# Patient Record
Sex: MALE | Race: WHITE | NOT HISPANIC OR LATINO | URBAN - METROPOLITAN AREA
[De-identification: names, ages, dates, MRNs, and addresses within clinical notes are randomized per-mention and may not be internally consistent; named-entity substitution may affect disease eponyms.]

---

## 2020-11-13 ENCOUNTER — INPATIENT (INPATIENT)
Facility: HOSPITAL | Age: 85
LOS: 5 days | Discharge: HOME | DRG: 552 | End: 2020-11-19
Attending: SURGERY | Admitting: SURGERY
Payer: MEDICARE

## 2020-11-13 VITALS
SYSTOLIC BLOOD PRESSURE: 109 MMHG | OXYGEN SATURATION: 99 % | WEIGHT: 184.97 LBS | HEIGHT: 70 IN | HEART RATE: 69 BPM | RESPIRATION RATE: 18 BRPM | DIASTOLIC BLOOD PRESSURE: 68 MMHG

## 2020-11-13 DIAGNOSIS — S12.9XXA FRACTURE OF NECK, UNSPECIFIED, INITIAL ENCOUNTER: ICD-10-CM

## 2020-11-13 LAB
ALBUMIN SERPL ELPH-MCNC: 3.8 G/DL — SIGNIFICANT CHANGE UP (ref 3.3–5)
ALP SERPL-CCNC: 70 U/L — SIGNIFICANT CHANGE UP (ref 40–120)
ALT FLD-CCNC: 24 U/L — SIGNIFICANT CHANGE UP (ref 10–45)
ANION GAP SERPL CALC-SCNC: 16 MMOL/L — SIGNIFICANT CHANGE UP (ref 5–17)
ANISOCYTOSIS BLD QL: SLIGHT — SIGNIFICANT CHANGE UP
APPEARANCE UR: CLEAR — SIGNIFICANT CHANGE UP
APTT BLD: 31.9 SEC — SIGNIFICANT CHANGE UP (ref 27.5–35.5)
AST SERPL-CCNC: 28 U/L — SIGNIFICANT CHANGE UP (ref 10–40)
BACTERIA # UR AUTO: NEGATIVE — SIGNIFICANT CHANGE UP
BASOPHILS # BLD AUTO: 0 K/UL — SIGNIFICANT CHANGE UP (ref 0–0.2)
BASOPHILS NFR BLD AUTO: 0 % — SIGNIFICANT CHANGE UP (ref 0–2)
BILIRUB SERPL-MCNC: 0.7 MG/DL — SIGNIFICANT CHANGE UP (ref 0.2–1.2)
BILIRUB UR-MCNC: NEGATIVE — SIGNIFICANT CHANGE UP
BUN SERPL-MCNC: 78 MG/DL — HIGH (ref 7–23)
CALCIUM SERPL-MCNC: 9.4 MG/DL — SIGNIFICANT CHANGE UP (ref 8.4–10.5)
CHLORIDE SERPL-SCNC: 86 MMOL/L — LOW (ref 96–108)
CO2 SERPL-SCNC: 30 MMOL/L — SIGNIFICANT CHANGE UP (ref 22–31)
COLOR SPEC: SIGNIFICANT CHANGE UP
CREAT SERPL-MCNC: 2.24 MG/DL — HIGH (ref 0.5–1.3)
DIFF PNL FLD: NEGATIVE — SIGNIFICANT CHANGE UP
ELLIPTOCYTES BLD QL SMEAR: SLIGHT — SIGNIFICANT CHANGE UP
EOSINOPHIL # BLD AUTO: 0 K/UL — SIGNIFICANT CHANGE UP (ref 0–0.5)
EOSINOPHIL NFR BLD AUTO: 0 % — SIGNIFICANT CHANGE UP (ref 0–6)
EPI CELLS # UR: 0 /HPF — SIGNIFICANT CHANGE UP
ETHANOL SERPL-MCNC: SIGNIFICANT CHANGE UP MG/DL (ref 0–10)
GIANT PLATELETS BLD QL SMEAR: PRESENT — SIGNIFICANT CHANGE UP
GLUCOSE SERPL-MCNC: 189 MG/DL — HIGH (ref 70–99)
GLUCOSE UR QL: NEGATIVE — SIGNIFICANT CHANGE UP
HCT VFR BLD CALC: 34.5 % — LOW (ref 39–50)
HGB BLD-MCNC: 11.3 G/DL — LOW (ref 13–17)
HYALINE CASTS # UR AUTO: 1 /LPF — SIGNIFICANT CHANGE UP (ref 0–2)
INR BLD: 1.41 RATIO — HIGH (ref 0.88–1.16)
KETONES UR-MCNC: NEGATIVE — SIGNIFICANT CHANGE UP
LACTATE SERPL-SCNC: 2 MMOL/L — SIGNIFICANT CHANGE UP (ref 0.7–2)
LEUKOCYTE ESTERASE UR-ACNC: NEGATIVE — SIGNIFICANT CHANGE UP
LIDOCAIN IGE QN: 63 U/L — HIGH (ref 7–60)
LYMPHOCYTES # BLD AUTO: 0.91 K/UL — LOW (ref 1–3.3)
LYMPHOCYTES # BLD AUTO: 24.6 % — SIGNIFICANT CHANGE UP (ref 13–44)
MACROCYTES BLD QL: SLIGHT — SIGNIFICANT CHANGE UP
MANUAL SMEAR VERIFICATION: SIGNIFICANT CHANGE UP
MCHC RBC-ENTMCNC: 30.6 PG — SIGNIFICANT CHANGE UP (ref 27–34)
MCHC RBC-ENTMCNC: 32.8 GM/DL — SIGNIFICANT CHANGE UP (ref 32–36)
MCV RBC AUTO: 93.5 FL — SIGNIFICANT CHANGE UP (ref 80–100)
METAMYELOCYTES # FLD: 0.9 % — HIGH (ref 0–0)
MONOCYTES # BLD AUTO: 0.85 K/UL — SIGNIFICANT CHANGE UP (ref 0–0.9)
MONOCYTES NFR BLD AUTO: 22.8 % — HIGH (ref 2–14)
NEUTROPHILS # BLD AUTO: 1.92 K/UL — SIGNIFICANT CHANGE UP (ref 1.8–7.4)
NEUTROPHILS NFR BLD AUTO: 51.7 % — SIGNIFICANT CHANGE UP (ref 43–77)
NITRITE UR-MCNC: NEGATIVE — SIGNIFICANT CHANGE UP
NRBC # BLD: 1 /100 — HIGH (ref 0–0)
PH UR: 6 — SIGNIFICANT CHANGE UP (ref 5–8)
PLAT MORPH BLD: SIGNIFICANT CHANGE UP
PLATELET # BLD AUTO: 136 K/UL — LOW (ref 150–400)
POIKILOCYTOSIS BLD QL AUTO: SLIGHT — SIGNIFICANT CHANGE UP
POTASSIUM SERPL-MCNC: 2.6 MMOL/L — CRITICAL LOW (ref 3.5–5.3)
POTASSIUM SERPL-SCNC: 2.6 MMOL/L — CRITICAL LOW (ref 3.5–5.3)
PROT SERPL-MCNC: 7.1 G/DL — SIGNIFICANT CHANGE UP (ref 6–8.3)
PROT UR-MCNC: NEGATIVE — SIGNIFICANT CHANGE UP
PROTHROM AB SERPL-ACNC: 16.6 SEC — HIGH (ref 10.6–13.6)
RBC # BLD: 3.69 M/UL — LOW (ref 4.2–5.8)
RBC # FLD: 15 % — HIGH (ref 10.3–14.5)
RBC BLD AUTO: ABNORMAL
RBC CASTS # UR COMP ASSIST: 0 /HPF — SIGNIFICANT CHANGE UP (ref 0–4)
SARS-COV-2 RNA SPEC QL NAA+PROBE: SIGNIFICANT CHANGE UP
SODIUM SERPL-SCNC: 132 MMOL/L — LOW (ref 135–145)
SP GR SPEC: 1.01 — SIGNIFICANT CHANGE UP (ref 1.01–1.02)
TARGETS BLD QL SMEAR: SLIGHT — SIGNIFICANT CHANGE UP
TROPONIN T, HIGH SENSITIVITY RESULT: 25 NG/L — SIGNIFICANT CHANGE UP (ref 0–51)
UROBILINOGEN FLD QL: NEGATIVE — SIGNIFICANT CHANGE UP
WBC # BLD: 3.71 K/UL — LOW (ref 3.8–10.5)
WBC # FLD AUTO: 3.71 K/UL — LOW (ref 3.8–10.5)
WBC UR QL: 0 /HPF — SIGNIFICANT CHANGE UP (ref 0–5)

## 2020-11-13 PROCEDURE — 93010 ELECTROCARDIOGRAM REPORT: CPT | Mod: 59,GC

## 2020-11-13 PROCEDURE — 72125 CT NECK SPINE W/O DYE: CPT | Mod: 26

## 2020-11-13 PROCEDURE — 99291 CRITICAL CARE FIRST HOUR: CPT | Mod: CS,25,GC

## 2020-11-13 PROCEDURE — 74176 CT ABD & PELVIS W/O CONTRAST: CPT | Mod: 26

## 2020-11-13 PROCEDURE — 71250 CT THORAX DX C-: CPT | Mod: 26

## 2020-11-13 PROCEDURE — 70450 CT HEAD/BRAIN W/O DYE: CPT | Mod: 26

## 2020-11-13 PROCEDURE — 12011 RPR F/E/E/N/L/M 2.5 CM/<: CPT | Mod: GC

## 2020-11-13 PROCEDURE — 99222 1ST HOSP IP/OBS MODERATE 55: CPT

## 2020-11-13 PROCEDURE — 71045 X-RAY EXAM CHEST 1 VIEW: CPT | Mod: 26

## 2020-11-13 PROCEDURE — 72170 X-RAY EXAM OF PELVIS: CPT | Mod: 26

## 2020-11-13 PROCEDURE — 70486 CT MAXILLOFACIAL W/O DYE: CPT | Mod: 26

## 2020-11-13 PROCEDURE — 76377 3D RENDER W/INTRP POSTPROCES: CPT | Mod: 26

## 2020-11-13 PROCEDURE — 73080 X-RAY EXAM OF ELBOW: CPT | Mod: 26,RT

## 2020-11-13 RX ORDER — AMIODARONE HYDROCHLORIDE 400 MG/1
1 TABLET ORAL
Qty: 0 | Refills: 0 | DISCHARGE

## 2020-11-13 RX ORDER — GABAPENTIN 400 MG/1
1 CAPSULE ORAL
Qty: 0 | Refills: 0 | DISCHARGE

## 2020-11-13 RX ORDER — FUROSEMIDE 40 MG
20 TABLET ORAL DAILY
Refills: 0 | Status: DISCONTINUED | OUTPATIENT
Start: 2020-11-13 | End: 2020-11-19

## 2020-11-13 RX ORDER — FUROSEMIDE 40 MG
1 TABLET ORAL
Qty: 0 | Refills: 0 | DISCHARGE

## 2020-11-13 RX ORDER — POTASSIUM CHLORIDE 20 MEQ
10 PACKET (EA) ORAL
Refills: 0 | Status: COMPLETED | OUTPATIENT
Start: 2020-11-13 | End: 2020-11-13

## 2020-11-13 RX ORDER — TAMSULOSIN HYDROCHLORIDE 0.4 MG/1
0.4 CAPSULE ORAL AT BEDTIME
Refills: 0 | Status: DISCONTINUED | OUTPATIENT
Start: 2020-11-13 | End: 2020-11-19

## 2020-11-13 RX ORDER — COLCHICINE 0.6 MG
0.6 TABLET ORAL DAILY
Refills: 0 | Status: DISCONTINUED | OUTPATIENT
Start: 2020-11-13 | End: 2020-11-19

## 2020-11-13 RX ORDER — SODIUM CHLORIDE 9 MG/ML
1000 INJECTION, SOLUTION INTRAVENOUS
Refills: 0 | Status: DISCONTINUED | OUTPATIENT
Start: 2020-11-13 | End: 2020-11-15

## 2020-11-13 RX ORDER — COLCHICINE 0.6 MG
1 TABLET ORAL
Qty: 0 | Refills: 0 | DISCHARGE

## 2020-11-13 RX ORDER — TETANUS TOXOID, REDUCED DIPHTHERIA TOXOID AND ACELLULAR PERTUSSIS VACCINE, ADSORBED 5; 2.5; 8; 8; 2.5 [IU]/.5ML; [IU]/.5ML; UG/.5ML; UG/.5ML; UG/.5ML
0.5 SUSPENSION INTRAMUSCULAR ONCE
Refills: 0 | Status: COMPLETED | OUTPATIENT
Start: 2020-11-13 | End: 2020-11-13

## 2020-11-13 RX ORDER — AMIODARONE HYDROCHLORIDE 400 MG/1
200 TABLET ORAL DAILY
Refills: 0 | Status: DISCONTINUED | OUTPATIENT
Start: 2020-11-13 | End: 2020-11-19

## 2020-11-13 RX ORDER — ACETAMINOPHEN 500 MG
650 TABLET ORAL ONCE
Refills: 0 | Status: COMPLETED | OUTPATIENT
Start: 2020-11-13 | End: 2020-11-13

## 2020-11-13 RX ORDER — TAMSULOSIN HYDROCHLORIDE 0.4 MG/1
1 CAPSULE ORAL
Qty: 0 | Refills: 0 | DISCHARGE

## 2020-11-13 RX ADMIN — Medication 100 MILLIEQUIVALENT(S): at 15:56

## 2020-11-13 RX ADMIN — Medication 650 MILLIGRAM(S): at 11:42

## 2020-11-13 RX ADMIN — TETANUS TOXOID, REDUCED DIPHTHERIA TOXOID AND ACELLULAR PERTUSSIS VACCINE, ADSORBED 0.5 MILLILITER(S): 5; 2.5; 8; 8; 2.5 SUSPENSION INTRAMUSCULAR at 11:43

## 2020-11-13 RX ADMIN — Medication 100 MILLIEQUIVALENT(S): at 14:20

## 2020-11-13 RX ADMIN — SODIUM CHLORIDE 100 MILLILITER(S): 9 INJECTION, SOLUTION INTRAVENOUS at 19:15

## 2020-11-13 RX ADMIN — Medication 650 MILLIGRAM(S): at 16:55

## 2020-11-13 RX ADMIN — Medication 100 MILLIEQUIVALENT(S): at 16:59

## 2020-11-13 NOTE — CONSULT NOTE ADULT - SUBJECTIVE AND OBJECTIVE BOX
[facility]   MD Iron Jackson MD, D.O.  KATELYN Puentes    From 7 AM - 5 PM:  OFFICE: [phone]  Dr. Ramsay cell: [phone]  Dr. Pichardo Cell: [phone]  Dr. Prieto cell: [phone]  KATELYN Reese cell: [phone]    From 5 PM - 7 AM: Answering Service: [phone]  Date of service 11-13-20 @ 16:28    -- INITIAL RENAL CONSULT NOTE  --------------------------------------------------------------------------------  HPI:  89 y/o m with pmhx afib on Xarelto, HAYLEE, BIBEMS s/p unwitnessed fall. Pt states that he slipped and fall on his wet hard wood floor. Pt states that he fell face forward. Pt unclear if he had LOC. Spoke to pt's wife on the phone reports that he had been feeling dizzy for the past 1 day. Pt seen and examined in the ER. States he follow with Dr. Goldberg for many years and have never had renal disease. Labs reviewed on [facility], last scr 1.9 in 10/2019    Pt urinating and has no complaints.     PAST HISTORY  --------------------------------------------------------------------------------  PAST MEDICAL & SURGICAL HISTORY:  History of orchiectomy    Obstructive sleep apnea    Atrial fibrillation    Pericardial effusion    H/O total knee replacement      FAMILY HISTORY:    PAST SOCIAL HISTORY:    ALLERGIES & MEDICATIONS  --------------------------------------------------------------------------------  Allergies    shellfish (Unknown)  sulfa drugs (Unknown)    Intolerances      Standing Inpatient Medications  potassium chloride  10 mEq/100 mL IVPB 10 milliEquivalent(s) IV Intermittent every 1 hour    PRN Inpatient Medications      REVIEW OF SYSTEMS  --------------------------------------------------------------------------------  Gen: No fevers/chills  Skin: No rashes  Head/Eyes/Ears: Normal hearing,  Normal vision   Respiratory: No dyspnea, cough  CV: No chest pain  GI: No abdominal pain, diarrhea, constipation, nausea, vomiting  : No dysuria, hematuria  MSK: No  edema  Heme: No easy bruising or bleeding  Psych: No significant depression    All other systems were reviewed and are negative, except as noted.    VITALS/PHYSICAL EXAM  --------------------------------------------------------------------------------  T(C): 36.2 (11-13-20 @ 15:40), Max: 36.8 (11-13-20 @ 12:40)  HR: 68 (11-13-20 @ 15:40) (65 - 70)  BP: 133/74 (11-13-20 @ 15:40) (109/68 - 133/74)  RR: 20 (11-13-20 @ 15:40) (18 - 20)  SpO2: 100% (11-13-20 @ 15:40) (99% - 100%)  Wt(kg): --  Height (cm): 177.8 (11-13-20 @ 10:54)  Weight (kg): 83.9 (11-13-20 @ 10:54)  BMI (kg/m2): 26.5 (11-13-20 @ 10:54)  BSA (m2): 2.02 (11-13-20 @ 10:54)      Physical Exam:  	Gen: NAD, facial stitches   	HEENT: MMM, neck collar   	Pulm: CTA B/L  	CV: S1S2  	Abd: Soft, +BS   	Ext: No LE edema B/L  	Neuro: Awake  	Skin: Warm and dry, blood hands   	    LABS/STUDIES  --------------------------------------------------------------------------------              11.3   3.71  >-----------<  136      [11-13-20 @ 11:45]              34.5     132  |  86  |  78  ----------------------------<  189      [11-13-20 @ 11:45]  2.6   |  30  |  2.24        Ca     9.4     [11-13-20 @ 11:45]    TPro  7.1  /  Alb  3.8  /  TBili  0.7  /  DBili  x   /  AST  28  /  ALT  24  /  AlkPhos  70  [11-13-20 @ 11:45]    PT/INR: PT 16.6 , INR 1.41       [11-13-20 @ 11:45]  PTT: 31.9       [11-13-20 @ 11:45]      Creatinine Trend:  SCr 2.24 [11-13 @ 11:45]

## 2020-11-13 NOTE — H&P ADULT - NSHPLABSRESULTS_GEN_ALL_CORE
LABS:                        11.3   3.71  )-----------( 136      ( 13 Nov 2020 11:45 )             34.5     13 Nov 2020 11:45    132    |  86     |  78     ----------------------------<  189    2.6     |  30     |  2.24     Ca    9.4        13 Nov 2020 11:45    TPro  7.1    /  Alb  3.8    /  TBili  0.7    /  DBili  x      /  AST  28     /  ALT  24     /  AlkPhos  70     13 Nov 2020 11:45    PT/INR - ( 13 Nov 2020 11:45 )   PT: 16.6 sec;   INR: 1.41 ratio         PTT - ( 13 Nov 2020 11:45 )  PTT:31.9 sec  CAPILLARY BLOOD GLUCOSE      POCT Blood Glucose.: 188 mg/dL (13 Nov 2020 11:03)        LIVER FUNCTIONS - ( 13 Nov 2020 11:45 )  Alb: 3.8 g/dL / Pro: 7.1 g/dL / ALK PHOS: 70 U/L / ALT: 24 U/L / AST: 28 U/L / GGT: x               IMAGING:    CT Abdomen and Pelvis No Cont (11.13.20 @ 14:28)    FINDINGS:  CHEST:  LUNGS AND LARGE AIRWAYS: Patent central airways. Rounded atelectasis in the left lower lobe and lingula.  PLEURA: Small left pleural effusion with loculation and thickening likely on the basis of a chronic effusion.  VESSELS: Atherosclerotic changes of the aorta and coronary arteries.  HEART: Heart size is normal. No pericardial effusion.  MEDIASTINUM AND LYN: No lymphadenopathy.  CHEST WALL AND LOWER NECK: Left chest cardiac device.    ABDOMEN AND PELVIS:  LIVER: Within normal limits.  BILE DUCTS: Normal caliber.  GALLBLADDER: Within normal limits.  SPLEEN: Within normal limits.  PANCREAS: Within normal limits.  ADRENALS: Within normal limits.  KIDNEYS/URETERS: Within normal limits.    BLADDER: Within normal limits.  REPRODUCTIVE ORGANS: Prostate is enlarged.    BOWEL: No bowel obstruction. Colonic diverticulosis.  PERITONEUM: No ascites.  VESSELS: Atherosclerotic changes.  RETROPERITONEUM/LYMPH NODES: No lymphadenopathy. No retroperitoneal hemorrhage.  ABDOMINAL WALL: Within normal limits.  BONES: Degenerative changes. Left shoulder replacement.    IMPRESSION:  No acute pathology. Specifically, no retroperitoneal hemorrhage.  Chronic left pleural effusion with rounded atelectasis in the lingula and left lower lobe.

## 2020-11-13 NOTE — ED PROVIDER NOTE - CLINICAL SUMMARY MEDICAL DECISION MAKING FREE TEXT BOX
ATTG: : fall with head trauma - check ct head /max facial / neck / chest abd and pelvis,check  labs, tetanus, xray elbow, check coags, re eval for dispo

## 2020-11-13 NOTE — ED PROVIDER NOTE - CARE PLAN
Principal Discharge DX:	Cervical spine fracture  Secondary Diagnosis:	Hypokalemia  Secondary Diagnosis:	Nasal bone fracture  Secondary Diagnosis:	Fall at home, initial encounter

## 2020-11-13 NOTE — ED PROVIDER NOTE - NS ED ROS FT
GENERAL: No fever or chills, EYES: no change in vision, HEENT: +head injury no trouble speaking, CARDIAC: no chest pain, palpitation PULMONARY: no cough or SOB, GI: no abdominal pain, no nausea, no vomiting, no diarrhea or constipation, : No changes in urination, SKIN: + lacerations no rashes, NEURO: + headache,  MSK: No muscle pain ~Melodie Christine MD

## 2020-11-13 NOTE — H&P ADULT - NSICDXPASTMEDICALHX_GEN_ALL_CORE_FT
PAST MEDICAL HISTORY:  Atrial fibrillation     History of orchiectomy     Obstructive sleep apnea     Pericardial effusion

## 2020-11-13 NOTE — CONSULT NOTE ADULT - ASSESSMENT
SUNNY RUIZ  89M w/ PMHx of aFib on Xafaustinato, HAYLEE, presenting to [facility] after suffering a unwitnessed mechanical fall found to have multiple facial fxs and cervical fxs. Currently has no headache, neck pain or radiculopathy/ numbness/ weakness.   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 multi level degen and foraminal stenosis. HCT negative, T/L spine negative for fx. Has AS.   Exam: intact with facial lac/ bruising.   - no acute neurosurgical intervention  -C collar at all times  - Please obtain a cervical brace. Please contact aramis blackmon for a fitted brace: [phone]  -MRI c spine wo

## 2020-11-13 NOTE — ED ADULT NURSE NOTE - NSIMPLEMENTINTERV_GEN_ALL_ED
Implemented All Fall with Harm Risk Interventions:  [place] to call system. Call bell, personal items and telephone within reach. Instruct patient to call for assistance. Room bathroom lighting operational. Non-slip footwear when patient is off stretcher. Physically safe environment: no spills, clutter or unnecessary equipment. Stretcher in lowest position, wheels locked, appropriate side rails in place. Provide visual cue, wrist band, yellow gown, etc. Monitor gait and stability. Monitor for mental status changes and reorient to person, place, and time. Review medications for side effects contributing to fall risk. Reinforce activity limits and safety measures with patient and family. Provide visual clues: red socks.

## 2020-11-13 NOTE — H&P ADULT - ASSESSMENT
89M presenting s/p mechanical fall found to have nasal bone fracture cervical spine fractures, and right SCM hematoma.    PLAN:  - Admit to Acute Care Surgery under Dr. Lane  - Given mechanism of injury and findings of multiple injuries, patient would benefit from pain control and consultation to plastic Sx for facial fractures and Orthopeadic surgery for cervical fractures.  - F/u spine and plastic surgery recommendations    Seen and discussed with attending surgeon Dr. Lane.    RAISSA Cerda, PGY-2   [facility]   Acute Care Surgery   [id number]

## 2020-11-13 NOTE — ED ADULT NURSE REASSESSMENT NOTE - NS ED NURSE REASSESS COMMENT FT1
Pt back from CT, endorsing mild pain at this time. VSS, safety maintained, will continue to monitor.

## 2020-11-13 NOTE — ED PROVIDER NOTE - OBJECTIVE STATEMENT
Melodie Christine MD: 89 y/o m with pmhx afib on Xarelto, HAYLEE, BIBEMS s/p unwitnessed fall. Pt states that he slipped and fall on his wet hard wood floor. Pt states that he fell face forward. Pt unclear if he had LOC. Spoke to pt's wife on the phone reports that he had been feeling dizzy for the past 1 day.

## 2020-11-13 NOTE — ED PROVIDER NOTE - ATTENDING CONTRIBUTION TO CARE
88 y/o m with pmhx afib on Xafaustinato, HAYLEE, presents by EMS from  home for fall with laceration to face. patient believes he tripped and fell. Unknown LOC. no weakness or numbness. bleeding from face. tetanus - unknown status.   GCS: 15  Primary Survey ABCDE intact  Secondary Survey:  Gen:  No respiratory Distress  /no distress from pain  HEENT: pupils 3 mm reactive to light equally, laceration above right eyebrow approx 3 cm, laceration to nose bridge on right side approx 2.5 cm linear. no step off. no nasal sep hematoma. mouth - dried blood, no loose teeth. patent airway with uvula midline.  EOMI  NO Raccoon Eyes/ Li Sign/ Neck: C- spine non tender. no step off or deformity. tm clear.   Lungs: breath sounds:   CVS: S1S2,    Distal Pulses:  Abd: soft non tender no distention  Extremities: no edema or erythema. no tenderness.   MSK: strength: 5/5 b/l upper and lower ext, moving all ext spontaneously  Back: no midline tend or step off  Neuro: aaox3 no foal deficits.

## 2020-11-13 NOTE — CONSULT NOTE ADULT - ASSESSMENT
88 y/o m with pmhx afib on Xarelto, HAYLEE, BIBEMS s/p unwitnessed fall. Pt states that he slipped and fall on his wet hard wood floor.     NURYS on CKD vs. progressive CKD   Labs reviewed on [facility], last scr 1.6 in 10/2019    Now admitted with elevated Scr   Pt non-oliguric   Pt receiving IVF in the ER   CT scan without hydronephrosis   Monitor urine output for hematuria   Check UA with urine electrolytes   Avoid nephrotoxins     Hypokalemia   serum K low   s/p IV replacement in the ER  recommend repeat BMP prior to d/c    HTN  BP controlled    Anemia  Hb stable

## 2020-11-13 NOTE — ED PROVIDER NOTE - PHYSICAL EXAMINATION
Gen: AAOx3, non-toxic  Head: NCAT  HEENT: EOMI, PERRLA, oral mucosa moist, normal conjunctiva, no septal hematoma seen in both nostrils. ttp of the nasal bone b/l  Lung: CTAB, no respiratory distress, no wheezes/rhonchi/rales B/L, speaking in full sentences  CV: RRR, no murmurs, rubs or gallops  Abd: soft, NTND, no guarding, no CVA tenderness, no rebound tenderness  MSK: no visible deformities, full range of motion of all 4 exts  Neuro: No focal sensory or motor deficits  Skin: Warm, well perfused, no rash,  2 cm laceration avoe the right eyebrow, +2 cm curved laceration on the right nasal bridge  Psych: normal affect.   ~Melodie Christine MD

## 2020-11-13 NOTE — H&P ADULT - HISTORY OF PRESENT ILLNESS
GENERAL SURGERY TRAUMA SERVICE   --------------------------------------------------------------------------------------------    TRAUMA ACTIVATION LEVEL: 3    MECHANISM OF INJURY:      [] Blunt:     [] Motor vehicle collision       [X] Fall       [] Pedestrian struck	      [] Motorcycle accident      [] Penetrating:     [] Gun shot wound       [] Stab wound    CHIEF COMPLAINT: Patient is a 89y old  Male who presents with a chief complaint of mechanical fall.    HPI: 89M w/ PMHx of aFib on [facility], HAYLEE, presenting to [facility] after suffering a unwitnessed mechanical fall. Patient was walking at home when he felt dizzy and lightheaded and fell forward hitting his face. Unknown LOC. Patient taken to the ED for further evaluation. Wife expressed that patient had been feeling dizzy and lightheaded for the past day.    In the ED, patient was afebrile, hemodynamically stable, labs remarkable for significant electrolyte imbalances with a potassium of 2.6 and acute kidney injury with BUN of 78 and Creatinine of 2.24, CTH and neck revealed comminuted fractures of bilateral nasal bones and soft tissue swelling right sternomastoid muscle with low attenuation collection and fluid level, (6:102), prevertebral soft tissue swelling, fused C5-C7 vertebra, linear lucencies concerning for fractures of C5, C6, C7 vertebra, horizontal fracture through osseous fused anterior longitudinal ligament C5-C6, (605:28, 604:20), correlate for hyperextension injury.  No fevers/chills, nausea/vomiting, chest pain/shortness of breath.    PRIMARY SURVEY:   A - airway intact  B - bilateral breath sounds and good chest rise  C - initial BP  BP: 133/74 (11-13-20 @ 15:40), HR HR: 68 (11-13-20 @ 15:40), palpable pulses in all extremities  D - GCS 15 on arrival. E 4, V 5, M 6.   Exposure obtained    SECONDARY SURVEY:  General: NAD  HEENT: Normocephalic, EOMI, PEERLA, laceration in right periorbital region s/p suturing in the ED, nasal swelling  Neck: Soft, midline trachea, C-collar in place  Chest: No chest wall tenderness  Cardiac: S1, S2, RRR  Respiratory: Bilateral breath sounds clear and equal   Abdomen: Soft, non-distended, non-tender; no rebound or guarding; no palpable masses   Groin: Normal appearing  Extremities: Palpable radial & DP pulses bilaterally. Motor and sensory grossly intact in all 4 extremities.  Back: No TTP; no palpable runoff/stepoff/deformity    ROS: 10-system review all negative except as noted in HPI.

## 2020-11-13 NOTE — H&P ADULT - ATTENDING COMMENTS
arranged for well fit / non extrication collar  spine consultation noted  Plastic surgery evaluation for nasal bone fracture  will need admission to trauma

## 2020-11-13 NOTE — H&P ADULT - NSHPPHYSICALEXAM_GEN_ALL_CORE
VITAL SIGNS:  Vital Signs Last 24 Hrs  T(C): 36.2 (13 Nov 2020 15:40), Max: 36.8 (13 Nov 2020 12:40)  T(F): 97.1 (13 Nov 2020 15:40), Max: 98.2 (13 Nov 2020 12:40)  HR: 68 (13 Nov 2020 15:40) (65 - 70)  BP: 133/74 (13 Nov 2020 15:40) (109/68 - 133/74)  BP(mean): --  RR: 20 (13 Nov 2020 15:40) (18 - 20)  SpO2: 100% (13 Nov 2020 15:40) (99% - 100%)      PHYSICAL EXAM:    General: NAD  HEENT: Normocephalic, EOMI, PEERLA, laceration in right periorbital region s/p suturing in the ED, nasal swelling  Neck: Soft, midline trachea, C-collar in place  Chest: No chest wall tenderness  Cardiac: S1, S2, RRR  Respiratory: Bilateral breath sounds clear and equal   Abdomen: Soft, non-distended, non-tender; no rebound or guarding; no palpable masses   Groin: Normal appearing  Extremities: Palpable radial & DP pulses bilaterally. Motor and sensory grossly intact in all 4 extremities.  Back: No TTP; no palpable runoff/stepoff/deformity

## 2020-11-13 NOTE — ED ADULT NURSE REASSESSMENT NOTE - NS ED NURSE REASSESS COMMENT FT1
pts linin changed, C-spine immobilization maintained. pt asking where is watch and pajamas are. RN did not fins any watch or clothing for pt. pt has jewelry in orange top cup sealed in zip lock in his hand. pt awaiting bed assignment

## 2020-11-13 NOTE — ED PROVIDER NOTE - PMH
Atrial fibrillation    History of orchiectomy    Obstructive sleep apnea    Pericardial effusion

## 2020-11-13 NOTE — ED ADULT NURSE NOTE - OBJECTIVE STATEMENT
Pt is an 89yM PMH a-fib on xarelto, HAYLEE BIBEMS s/p mechanical fall. P/w laceration over R eye and to bridge of nose, unknown LOC. Pt endorses slipping in the bathroom and falling. Denies dizziness, lightheadedness, chest pain, HA, changes to vision, numbness, tingling. A&Ox4, BLACK, lungs clear, distal pulses intact, abdomen soft nontender, lacerations noted to R eyebrow and nasal bridge. Side rails up for safety, call bell and personal items within reach, instructed to call for assistance, verbalizes understanding. Will continue to monitor.

## 2020-11-13 NOTE — CONSULT NOTE ADULT - SUBJECTIVE AND OBJECTIVE BOX
HPI: 89M w/ PMHx of aFib on [place], HAYLEE, presenting to [facility] after suffering a unwitnessed mechanical fall. Patient was walking at home when he felt dizzy and lightheaded and fell forward hitting his face. Unknown LOC. Patient taken to the ED for further evaluation. Wife expressed that patient had been feeling dizzy and lightheaded for the past day.    In the ED, patient was afebrile, hemodynamically stable, labs remarkable for significant electrolyte imbalances with a potassium of 2.6 and acute kidney injury with BUN of 78 and Creatinine of 2.24, CTH and neck revealed comminuted fractures of bilateral nasal bones and soft tissue swelling right sternomastoid muscle with low attenuation collection and fluid level, (6:102), prevertebral soft tissue swelling, fused C5-C7 vertebra, linear lucencies concerning for fractures of C5, C6, C7 vertebra, horizontal fracture through osseous fused anterior longitudinal ligament C5-C6, (605:28, 604:20), correlate for hyperextension injury.  No fevers/chills, nausea/vomiting, chest pain/shortness of breath.    PRIMARY SURVEY:   A - airway intact  B - bilateral breath sounds and good chest rise  C - initial BP  BP: 133/74 (11-13-20 @ 15:40), HR HR: 68 (11-13-20 @ 15:40), palpable pulses in all extremities  D - GCS 15 on arrival. E 4, V 5, M 6.   Exposure obtained    SECONDARY SURVEY:  General: NAD  HEENT: Normocephalic, EOMI, PEERLA, laceration in right periorbital region s/p suturing in the ED, nasal swelling  Neck: Soft, midline trachea, C-collar in place  Chest: No chest wall tenderness  Cardiac: S1, S2, RRR  Respiratory: Bilateral breath sounds clear and equal   Abdomen: Soft, non-distended, non-tender; no rebound or guarding; no palpable masses   Groin: Normal appearing  Extremities: Palpable radial & DP pulses bilaterally. Motor and sensory grossly intact in all 4 extremities.  Back: No TTP; no palpable runoff/stepoff/deformity    ROS: 10-system review all negative except as noted in HPI.   (13 Nov 2020 16:44)    Exam:  AOx3, FC, PERRL, EOMI, no facial, 5/5 throughout, no drift    --Anticoagulation:    =====================  PAST MEDICAL HISTORY   History of orchiectomy    Obstructive sleep apnea    Atrial fibrillation    Pericardial effusion      PAST SURGICAL HISTORY   H/O total knee replacement      shellfish (Unknown)  sulfa drugs (Unknown)      MEDICATIONS:  Antibiotics:    Neuro:    Other:  aMIOdarone    Tablet 200 milliGRAM(s) Oral daily  colchicine 0.6 milliGRAM(s) Oral daily  furosemide    Tablet 20 milliGRAM(s) Oral daily  lactated ringers. 1000 milliLiter(s) IV Continuous <Continuous>  metolazone 5 milliGRAM(s) Oral daily  predniSONE   Tablet 10 milliGRAM(s) Oral daily  tamsulosin 0.4 milliGRAM(s) Oral at bedtime      SOCIAL HISTORY:   Occupation:   Marital Status:     FAMILY HISTORY:      ROS: Negative except per HPI    LABS:  PT/INR - ( 13 Nov 2020 11:45 )   PT: 16.6 sec;   INR: 1.41 ratio         PTT - ( 13 Nov 2020 11:45 )  PTT:31.9 sec                        11.3   3.71  )-----------( 136      ( 13 Nov 2020 11:45 )             34.5     11-13    132<L>  |  86<L>  |  78<H>  ----------------------------<  189<H>  2.6<LL>   |  30  |  2.24<H>    Ca    9.4      13 Nov 2020 11:45    TPro  7.1  /  Alb  3.8  /  TBili  0.7  /  DBili  x   /  AST  28  /  ALT  24  /  AlkPhos  70  11-13

## 2020-11-14 LAB
ALBUMIN SERPL ELPH-MCNC: 2.9 G/DL — LOW (ref 3.3–5)
ALP SERPL-CCNC: 53 U/L — SIGNIFICANT CHANGE UP (ref 40–120)
ALT FLD-CCNC: 19 U/L — SIGNIFICANT CHANGE UP (ref 10–45)
ANION GAP SERPL CALC-SCNC: 13 MMOL/L — SIGNIFICANT CHANGE UP (ref 5–17)
AST SERPL-CCNC: 26 U/L — SIGNIFICANT CHANGE UP (ref 10–40)
BILIRUB SERPL-MCNC: 0.8 MG/DL — SIGNIFICANT CHANGE UP (ref 0.2–1.2)
BUN SERPL-MCNC: 52 MG/DL — HIGH (ref 7–23)
CALCIUM SERPL-MCNC: 8.4 MG/DL — SIGNIFICANT CHANGE UP (ref 8.4–10.5)
CHLORIDE SERPL-SCNC: 94 MMOL/L — LOW (ref 96–108)
CO2 SERPL-SCNC: 28 MMOL/L — SIGNIFICANT CHANGE UP (ref 22–31)
CREAT SERPL-MCNC: 1.36 MG/DL — HIGH (ref 0.5–1.3)
GLUCOSE SERPL-MCNC: 92 MG/DL — SIGNIFICANT CHANGE UP (ref 70–99)
HCT VFR BLD CALC: 28.5 % — LOW (ref 39–50)
HGB BLD-MCNC: 9.4 G/DL — LOW (ref 13–17)
MAGNESIUM SERPL-MCNC: 1.6 MG/DL — SIGNIFICANT CHANGE UP (ref 1.6–2.6)
MCHC RBC-ENTMCNC: 31.1 PG — SIGNIFICANT CHANGE UP (ref 27–34)
MCHC RBC-ENTMCNC: 33 GM/DL — SIGNIFICANT CHANGE UP (ref 32–36)
MCV RBC AUTO: 94.4 FL — SIGNIFICANT CHANGE UP (ref 80–100)
NRBC # BLD: 0 /100 WBCS — SIGNIFICANT CHANGE UP (ref 0–0)
PHOSPHATE SERPL-MCNC: 2.3 MG/DL — LOW (ref 2.5–4.5)
PLATELET # BLD AUTO: 111 K/UL — LOW (ref 150–400)
POTASSIUM SERPL-MCNC: 3 MMOL/L — LOW (ref 3.5–5.3)
POTASSIUM SERPL-SCNC: 3 MMOL/L — LOW (ref 3.5–5.3)
PROT SERPL-MCNC: 5.6 G/DL — LOW (ref 6–8.3)
RBC # BLD: 3.02 M/UL — LOW (ref 4.2–5.8)
RBC # FLD: 15.4 % — HIGH (ref 10.3–14.5)
SODIUM SERPL-SCNC: 135 MMOL/L — SIGNIFICANT CHANGE UP (ref 135–145)
WBC # BLD: 3.56 K/UL — LOW (ref 3.8–10.5)
WBC # FLD AUTO: 3.56 K/UL — LOW (ref 3.8–10.5)

## 2020-11-14 PROCEDURE — 99232 SBSQ HOSP IP/OBS MODERATE 35: CPT

## 2020-11-14 RX ORDER — POTASSIUM PHOSPHATE, MONOBASIC POTASSIUM PHOSPHATE, DIBASIC 236; 224 MG/ML; MG/ML
30 INJECTION, SOLUTION INTRAVENOUS ONCE
Refills: 0 | Status: COMPLETED | OUTPATIENT
Start: 2020-11-14 | End: 2020-11-14

## 2020-11-14 RX ADMIN — SODIUM CHLORIDE 100 MILLILITER(S): 9 INJECTION, SOLUTION INTRAVENOUS at 06:38

## 2020-11-14 RX ADMIN — POTASSIUM PHOSPHATE, MONOBASIC POTASSIUM PHOSPHATE, DIBASIC 83.33 MILLIMOLE(S): 236; 224 INJECTION, SOLUTION INTRAVENOUS at 22:31

## 2020-11-14 RX ADMIN — TAMSULOSIN HYDROCHLORIDE 0.4 MILLIGRAM(S): 0.4 CAPSULE ORAL at 21:03

## 2020-11-14 RX ADMIN — SODIUM CHLORIDE 100 MILLILITER(S): 9 INJECTION, SOLUTION INTRAVENOUS at 21:04

## 2020-11-14 RX ADMIN — Medication 0.6 MILLIGRAM(S): at 11:03

## 2020-11-14 RX ADMIN — AMIODARONE HYDROCHLORIDE 200 MILLIGRAM(S): 400 TABLET ORAL at 06:38

## 2020-11-14 RX ADMIN — Medication 20 MILLIGRAM(S): at 06:38

## 2020-11-14 RX ADMIN — Medication 10 MILLIGRAM(S): at 06:38

## 2020-11-14 NOTE — PROGRESS NOTE ADULT - ASSESSMENT
89M presenting s/p mechanical fall found to have nasal bone fracture cervical spine fractures, and right SCM hematoma. Patient is with C collar, neck immobilized, facial lacerations sutured, and hemodynamically stable.    PLAN:    - Advance diet as tolerated  - Pain control   - Will obtain C-collar, Kel Siu aware and on board.   - Appreciate Neuro recs: C- collar all time and MRI C-spine.  - Appreciate Plastic's recs: No immediate intervention for nasal bone fracture. Patient can f/u as outpatient one week after discharge.  - Isaiah improving, downtrending, renal following  -  Holding DVT ppx    Acute Care Surgery   [id number]     89M presenting s/p mechanical fall found to have nasal bone fracture cervical spine fractures, and right SCM hematoma. Patient is with C collar, neck immobilized, facial lacerations sutured, and hemodynamically stable.    PLAN:    - Advance diet as tolerated  - Pain control   - Will obtain C-collar, Kel Siu aware and on board.   - Appreciate Neuro recs: C- collar all time.  - MRI C-spine pending magnet compatibility with patient's Pacemaker.  - Appreciate Plastic's recs: No immediate intervention for nasal bone fracture. Patient can f/u as outpatient one week after discharge.  - Isaiah improving, downtrending, renal following  -  Holding DVT ppx    Acute Care Surgery   [id number]     89M presenting s/p mechanical fall found to have nasal bone fracture cervical spine fractures, and right SCM hematoma. Patient is with C collar, neck immobilized, facial lacerations sutured, and hemodynamically stable.    PLAN:    - Advance diet as tolerated  - Pain control   - Will obtain C-collar, Kel Siu aware and on board.   - Appreciate Neuro recs: C- collar all time.  - MRI C-spine pending magnet compatibility with patient's Pacemaker.  - Appreciate Plastic's recs: No immediate intervention for nasal bone fracture. Patient can f/u as outpatient one week after discharge.  - Isaiah improving, downtrending, renal following  -  Will hold AC (xarelto) Will resume DVT ppx (40 mg Lovenox) starting from Tmr.        Acute Care Surgery   [id number]

## 2020-11-14 NOTE — PHYSICAL THERAPY INITIAL EVALUATION ADULT - CRITERIA FOR SKILLED THERAPEUTIC INTERVENTIONS
impairments found/anticipated discharge recommendation/home PT for progressive bed mobs, transfers, amb training, strengthening and standing balance. / rolling walker

## 2020-11-14 NOTE — PROGRESS NOTE ADULT - SUBJECTIVE AND OBJECTIVE BOX
Patient evaluated measured and fitted for rigid  cervical spinal orthosis to treat post C5,C6,C7 fractures  delivered today in the ER holding delivered by [facility] [phone]   ordered by neurosurgery.

## 2020-11-14 NOTE — PHYSICAL THERAPY INITIAL EVALUATION ADULT - GENERAL OBSERVATIONS, REHAB EVAL
Rec'd in bed, + aspen collar, ecchymosis face w/ stitches above eyebrows, + IV LUE, NAD< agreeable to PT/ dtr present 1/2 way through session

## 2020-11-14 NOTE — PHYSICAL THERAPY INITIAL EVALUATION ADULT - PLANNED THERAPY INTERVENTIONS, PT EVAL
To negotiate one flight of stairs w/one handrail step over step 4 weeks/transfer training/bed mobility training/gait training/strengthening/balance training

## 2020-11-14 NOTE — PROGRESS NOTE ADULT - SUBJECTIVE AND OBJECTIVE BOX
No acute events reported over the past 24hrs    Patient seen and examined at bedside. Feeling well.   Pain is well controlled.  Tolerating diet without  nausea or vomiting. + Flatus, + BM      Physical Exam  General Appearance: Resting comfortably, no acute distress  Chest: non-labored breathing, no respiratory distress  CV: Pulse regular presently  Abdomen: Soft, non-tender, non-distended, no peritonitis   Incision: C/I/D  Stomy: viable, gas and stool in bag.  ROHAN: to bulb suction  Extremities: warm and well perfused      Vital Signs Last 24 Hrs  T(C): 36.4 (2020 07:39), Max: 36.8 (2020 12:40)  T(F): 97.5 (2020 07:39), Max: 98.2 (2020 12:40)  HR: 70 (2020 07:39) (65 - 98)  BP: 129/73 (2020 07:39) (111/59 - 146/79)  BP(mean): --  RR: 17 (2020 07:39) (17 - 20)  SpO2: 99% (2020 07:39) (97% - 100%)    I&O's Detail    2020 07:01  -  2020 07:00  --------------------------------------------------------  IN:    Lactated Ringers: 850 mL  Total IN: 850 mL    OUT:    Voided (mL): 295 mL  Total OUT: 295 mL    Total NET: 555 mL              135  |  94<L>  |  52<H>  ----------------------------<  92  3.0<L>   |  28  |  1.36<H>    Ca    8.4      2020 06:30  Phos  2.3     14  Mg     1.6         TPro  5.6<L>  /  Alb  2.9<L>  /  TBili  0.8  /  DBili  x   /  AST  26  /  ALT  19  /  AlkPhos  53                              9.4    3.56  )-----------( 111      ( 2020 06:30 )             28.5       PT/INR - ( 2020 11:45 )   PT: 16.6 sec;   INR: 1.41 ratio         PTT - ( 2020 11:45 )  PTT:31.9 sec    LABS:                         9.4    3.56  )-----------( 111      ( 2020 06:30 )             28.5     11-14    135  |  94<L>  |  52<H>  ----------------------------<  92  3.0<L>   |  28  |  1.36<H>    Ca    8.4      2020 06:30  Phos  2.3     -14  Mg     1.6     14    TPro  5.6<L>  /  Alb  2.9<L>  /  TBili  0.8  /  DBili  x   /  AST  26  /  ALT  19  /  AlkPhos  53  11-14    PT/INR - ( 2020 11:45 )   PT: 16.6 sec;   INR: 1.41 ratio         PTT - ( 2020 11:45 )  PTT:31.9 sec  Urinalysis Basic - ( 2020 19:29 )    Color: Light Yellow / Appearance: Clear / S.015 / pH: x  Gluc: x / Ketone: Negative  / Bili: Negative / Urobili: Negative   Blood: x / Protein: Negative / Nitrite: Negative   Leuk Esterase: Negative / RBC: 0 /hpf / WBC 0 /HPF   Sq Epi: x / Non Sq Epi: 0 /hpf / Bacteria: Negative            RADIOLOGY, EKG & ADDITIONAL TESTS: Reviewed.     MEDICATIONS  (STANDING):  aMIOdarone    Tablet 200 milliGRAM(s) Oral daily  colchicine 0.6 milliGRAM(s) Oral daily  furosemide    Tablet 20 milliGRAM(s) Oral daily  lactated ringers. 1000 milliLiter(s) (100 mL/Hr) IV Continuous <Continuous>  metolazone 5 milliGRAM(s) Oral daily  predniSONE   Tablet 10 milliGRAM(s) Oral daily  tamsulosin 0.4 milliGRAM(s) Oral at bedtime    MEDICATIONS  (PRN):   No acute events reported over the past 24hrs    Patient seen and examined at bedside. Feeling well.   Pain is well controlled.  NPO, nausea or vomiting. + Flatus, + BM.     Physical Exam:   General Appearance: Resting comfortably, no acute distress  Chest: non-labored breathing, no respiratory distress  CV: Pulse regular presently  Abdomen: Soft, non-tender, non-distended, no peritonitis   Neck: Immobilized with C collar.  Face: sutured by plastics        Vital Signs Last 24 Hrs  T(C): 36.4 (2020 07:39), Max: 36.8 (2020 12:40)  T(F): 97.5 (2020 07:39), Max: 98.2 (2020 12:40)  HR: 70 (2020 07:39) (65 - 98)  BP: 129/73 (2020 07:39) (111/59 - 146/79)  BP(mean): --  RR: 17 (2020 07:39) (17 - 20)  SpO2: 99% (2020 07:39) (97% - 100%)    I&O's Detail    2020 07:01  -  2020 07:00  --------------------------------------------------------  IN:    Lactated Ringers: 850 mL  Total IN: 850 mL    OUT:    Voided (mL): 295 mL  Total OUT: 295 mL    Total NET: 555 mL              135  |  94<L>  |  52<H>  ----------------------------<  92  3.0<L>   |  28  |  1.36<H>    Ca    8.4      2020 06:30  Phos  2.3     14  Mg     1.6         TPro  5.6<L>  /  Alb  2.9<L>  /  TBili  0.8  /  DBili  x   /  AST  26  /  ALT  19  /  AlkPhos  53  -14                            9.4    3.56  )-----------( 111      ( 2020 06:30 )             28.5       PT/INR - ( 2020 11:45 )   PT: 16.6 sec;   INR: 1.41 ratio         PTT - ( 2020 11:45 )  PTT:31.9 sec    LABS:                         9.4    3.56  )-----------( 111      ( 2020 06:30 )             28.5     11-14    135  |  94<L>  |  52<H>  ----------------------------<  92  3.0<L>   |  28  |  1.36<H>    Ca    8.4      2020 06:30  Phos  2.3     11-14  Mg     1.6     11-14    TPro  5.6<L>  /  Alb  2.9<L>  /  TBili  0.8  /  DBili  x   /  AST  26  /  ALT  19  /  AlkPhos  53  11-14    PT/INR - ( 2020 11:45 )   PT: 16.6 sec;   INR: 1.41 ratio         PTT - ( 2020 11:45 )  PTT:31.9 sec  Urinalysis Basic - ( 2020 19:29 )    Color: Light Yellow / Appearance: Clear / S.015 / pH: x  Gluc: x / Ketone: Negative  / Bili: Negative / Urobili: Negative   Blood: x / Protein: Negative / Nitrite: Negative   Leuk Esterase: Negative / RBC: 0 /hpf / WBC 0 /HPF   Sq Epi: x / Non Sq Epi: 0 /hpf / Bacteria: Negative      < from: CT Abdomen and Pelvis No Cont (1320 @ 14:28) >    EXAM:  CT ABDOMEN AND PELVIS                          EXAM:  CT CHEST                            PROCEDURE DATE:  2020            INTERPRETATION:  CLINICAL INFORMATION: Status post fall. Patient on anticoagulation.    COMPARISON: CT abdomen pelvis 12/10/2010    PROCEDURE:  CT of the Chest, Abdomen and Pelvis was performed without intravenous contrast.  Intravenous contrast: None.  Oral contrast: None.  Sagittal and coronal reformats were performed.    FINDINGS:  CHEST:  LUNGS AND LARGE AIRWAYS: Patent central airways. Rounded atelectasis in the left lower lobe and lingula.  PLEURA: Small left pleural effusion with loculation and thickening likely on the basis of a chronic effusion.  VESSELS: Atherosclerotic changes of the aorta and coronary arteries.  HEART: Heart size is normal. No pericardial effusion.  MEDIASTINUM AND LYN: No lymphadenopathy.  CHEST WALL AND LOWER NECK: Left chest cardiac device.    ABDOMEN AND PELVIS:  LIVER: Within normal limits.  BILE DUCTS: Normal caliber.  GALLBLADDER: Within normal limits.  SPLEEN: Within normal limits.  PANCREAS: Within normal limits.  ADRENALS: Within normal limits.  KIDNEYS/URETERS: Within normal limits.    BLADDER: Within normal limits.  REPRODUCTIVE ORGANS: Prostate is enlarged.    BOWEL: No bowel obstruction. Colonic diverticulosis.  PERITONEUM: No ascites.  VESSELS: Atherosclerotic changes.  RETROPERITONEUM/LYMPH NODES: No lymphadenopathy. No retroperitoneal hemorrhage.  ABDOMINAL WALL: Within normal limits.  BONES: Degenerative changes. Left shoulder replacement.    IMPRESSION:  No acute pathology. Specifically, no retroperitoneal hemorrhage.    Chronic left pleural effusion with rounded atelectasis in the lingula and left lower lobe.      < end of copied text >      MEDICATIONS  (STANDING):  aMIOdarone    Tablet 200 milliGRAM(s) Oral daily  colchicine 0.6 milliGRAM(s) Oral daily  furosemide    Tablet 20 milliGRAM(s) Oral daily  lactated ringers. 1000 milliLiter(s) (100 mL/Hr) IV Continuous <Continuous>  metolazone 5 milliGRAM(s) Oral daily  predniSONE   Tablet 10 milliGRAM(s) Oral daily  tamsulosin 0.4 milliGRAM(s) Oral at bedtime    MEDICATIONS  (PRN):

## 2020-11-14 NOTE — PROGRESS NOTE ADULT - ASSESSMENT
89 y/o m with pmhx afib on Xarelto, HAYLEE, BIBEMS s/p unwitnessed fall. Pt states that he slipped and fall on his wet hard wood floor.     NURYS on CKD vs. progressive CKD   Labs reviewed on [facility], last scr 1.6 in 10/2019    Pt admitted with elevated Scr to 2.2   Pt non-oliguric. Pt receiving IVF with improving SCr  NURYS likely pre-renal. continue IVF today.   CT scan without hydronephrosis   Monitor urine output for hematuria   UA without protein or blood. No further w/u needed   Cleared for d/c from nephrology once medically optimized   Avoid nephrotoxins     Hypokalemia   serum K low   replete PO as need for k < 3.5     HTN  BP controlled    Anemia  Hb stable

## 2020-11-14 NOTE — CONSULT NOTE ADULT - ATTENDING COMMENTS
Patient seen and examined, agree with the above assessment and plan by QUINN Morales.  89M w/ PMHx of aFib on Xarelto, HAYLEE, presenting to [facility] after suffering a unwitnessed mechanical fall.   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 multi level degen and foraminal stenosis. HCT negative, T/L spine negative for fx.   -trauma/neuro surg f/u   -MRI pending  -AC on hold in setting of traumatic fall , drop in h/h noted  - management per trauma surg

## 2020-11-14 NOTE — PHYSICAL THERAPY INITIAL EVALUATION ADULT - ADDITIONAL COMMENTS
lives w/ wife in private home 2 steps to enter no handrail and flight of stairs 14 w/ handrail to bedroom, no visual deficits, B/L hearing aids, and Right handed, / does not use assistive device

## 2020-11-14 NOTE — PHYSICAL THERAPY INITIAL EVALUATION ADULT - PERTINENT HX OF CURRENT PROBLEM, REHAB EVAL
89M w/ PMHx of aFib on Xarelto, HAYLEE, presenting to [facility] after suffering a unwitnessed mechanical fall. Pt was walking at home when he felt dizzy and lightheaded and fell forward hitting his face. Unknown LOC. Pt taken to the ED for further evaluation.

## 2020-11-14 NOTE — PHYSICAL THERAPY INITIAL EVALUATION ADULT - PRECAUTIONS/LIMITATIONS, REHAB EVAL
Wife expressed that patient had been feeling dizzy and lightheaded for the past day. In the ED, pt was afebrile, hemodynamically stable, labs remarkable for significant electrolyte imbalances with a potassium of 2.6 and acute kidney injury with BUN of 78 and Creatinine of 2.24, CTH and neck revealed comminuted fractures of bilateral nasal bones and soft tissue swelling right sternomastoid muscle with low attenuation collection and fluid level, (6:102), prevertebral soft tissue swelling, fused C5-C7 vertebra, linear lucencies concerning for fractures of C5, C6, C7 vertebra, horizontal fracture through osseous fused anterior longitudinal ligament C5-C6, (605:28, 604:20), correlate for hyperextension injury. XRayPelvis: (-) XRayRElbow: (-)CXR: Left pleural effusion, hemothorax is a consideration in the setting of trauma. fall precautions/Wife expressed that patient had been feeling dizzy and lightheaded for the past day. In the ED, pt was afebrile, hemodynamically stable, labs remarkable for significant electrolyte imbalances with a potassium of 2.6 and acute kidney injury with BUN of 78 and Creatinine of 2.24, CTH and neck revealed comminuted fractures of bilateral nasal bones and soft tissue swelling right sternomastoid muscle with low attenuation collection and fluid level, (6:102), prevertebral soft tissue swelling, fused C5-C7 vertebra, linear lucencies concerning for fractures of C5, C6, C7 vertebra, horizontal fracture through osseous fused anterior longitudinal ligament C5-C6, (605:28, 604:20), correlate for hyperextension injury. XRayPelvis: (-) XRayRElbow: (-)CXR: Left pleural effusion, hemothorax is a consideration in the setting of trauma.

## 2020-11-14 NOTE — PROGRESS NOTE ADULT - ASSESSMENT
89M w/ PMHx of aFib on HAYLEE Posada, presenting to [facility] after suffering a unwitnessed mechanical fall found to have multiple facial fxs and cervical fxs. Currently has no headache, neck pain or radiculopathy/ numbness/ weakness.   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 multi level degen and foraminal stenosis. HCT negative, T/L spine negative for fx. Has AS. Exam intact.   ADM trauma  - no acute neurosurgical intervention  - C collar at all times  - Please obtain a cervical brace. Please contact aramis blackmon for a fitted brace: [phone]  -MRI c spine wo pending

## 2020-11-14 NOTE — PROGRESS NOTE ADULT - SUBJECTIVE AND OBJECTIVE BOX
[facility]   MD Iron Jackson MD, D.O. Ruoru Wong, PA    From 7 AM - 5 PM:  OFFICE: [phone]  Dr. Ramsay cell: [phone]  Dr. Pichardo cell: [phone]  Dr. Prieto cell: [phone]  KATELYN Reese cell: [phone]    From 5 PM - 7 AM: Answering Service: [phone]  Date of service: 11-14-20 @ 12:19    RENAL FOLLOW UP NOTE  --------------------------------------------------------------------------------  HPI:  Pt seen and examined in ER    PAST HISTORY  --------------------------------------------------------------------------------  No significant changes to PMH, PSH, FHx, SHx, unless otherwise noted    ALLERGIES & MEDICATIONS  --------------------------------------------------------------------------------  Allergies    shellfish (Unknown)  sulfa drugs (Unknown)    Intolerances      Standing Inpatient Medications  aMIOdarone    Tablet 200 milliGRAM(s) Oral daily  colchicine 0.6 milliGRAM(s) Oral daily  furosemide    Tablet 20 milliGRAM(s) Oral daily  lactated ringers. 1000 milliLiter(s) IV Continuous <Continuous>  metolazone 5 milliGRAM(s) Oral daily  predniSONE   Tablet 10 milliGRAM(s) Oral daily  tamsulosin 0.4 milliGRAM(s) Oral at bedtime    PRN Inpatient Medications      REVIEW OF SYSTEMS  --------------------------------------------------------------------------------  General: no fever  CVS: no chest pain  RESP: no sob, no cough  ABD: no abdominal pain  : no dysuria  MSK: no edema     VITALS/PHYSICAL EXAM  --------------------------------------------------------------------------------  T(C): 36.4 (11-14-20 @ 07:39), Max: 36.8 (11-13-20 @ 12:40)  HR: 70 (11-14-20 @ 07:39) (65 - 98)  BP: 129/73 (11-14-20 @ 07:39) (111/59 - 146/79)  RR: 17 (11-14-20 @ 07:39) (17 - 20)  SpO2: 99% (11-14-20 @ 07:39) (97% - 100%)  Wt(kg): --  Height (cm): 177.8 (11-13-20 @ 10:54)  Weight (kg): 83.9 (11-13-20 @ 10:54)  BMI (kg/m2): 26.5 (11-13-20 @ 10:54)  BSA (m2): 2.02 (11-13-20 @ 10:54)      11-13-20 @ 07:01  -  11-14-20 @ 07:00  --------------------------------------------------------  IN: 850 mL / OUT: 295 mL / NET: 555 mL      Physical Exam:  	Gen: NAD  	HEENT: MMM, stiches noted, in neck collar   	Pulm: CTA B/L  	CV: S1S2  	Abd: Soft, +BS  	Ext: No LE edema B/L                      Neuro: Awake   	Skin: Warm and Dry   	    LABS/STUDIES  --------------------------------------------------------------------------------              9.4    3.56  >-----------<  111      [11-14-20 @ 06:30]              28.5     135  |  94  |  52  ----------------------------<  92      [11-14-20 @ 06:30]  3.0   |  28  |  1.36        Ca     8.4     [11-14-20 @ 06:30]      Mg     1.6     [11-14-20 @ 06:30]      Phos  2.3     [11-14-20 @ 06:30]    TPro  5.6  /  Alb  2.9  /  TBili  0.8  /  DBili  x   /  AST  26  /  ALT  19  /  AlkPhos  53  [11-14-20 @ 06:30]    PT/INR: PT 16.6 , INR 1.41       [11-13-20 @ 11:45]  PTT: 31.9       [11-13-20 @ 11:45]      Creatinine Trend:  SCr 1.36 [11-14 @ 06:30]  SCr 2.24 [11-13 @ 11:45]    Urinalysis - [11-13-20 @ 19:29]      Color Light Yellow / Appearance Clear / SG 1.015 / pH 6.0      Gluc Negative / Ketone Negative  / Bili Negative / Urobili Negative       Blood Negative / Protein Negative / Leuk Est Negative / Nitrite Negative      RBC 0 / WBC 0 / Hyaline 1 / Gran  / Sq Epi  / Non Sq Epi 0 / Bacteria Negative

## 2020-11-14 NOTE — CONSULT NOTE ADULT - ASSESSMENT
89M PMH AFib on xarelto, HAYLEE presenting s/p unwitnessed mechanical fall found to have nasal bone fracture, cervical spine fractures, and right SCM hematoma. medicine consult for co management.    # AFib  # HAYLEE  # unwitnessed mechanical fall  # nasal bone fx  # cervical spine fx  # NURYS    appreciate trauma recs  ro syncope, pt denies LOC but unwitnessed  tele, TTE, check orthostatics  cardio following  holding AC  appreciate NSG recs, pending MRI cervical  hypokalemia hypomag supplement  plastics consult  nurys improving, downtrending, renal following    PCP Dr. Steven Goldberg    Thank you for this consult. Please call [facility] with questions [phone].

## 2020-11-14 NOTE — CONSULT NOTE ADULT - SUBJECTIVE AND OBJECTIVE BOX
Patient is a 89y old  Male who presents with a chief complaint of     HPI:  GENERAL SURGERY TRAUMA SERVICE   --------------------------------------------------------------------------------------------    TRAUMA ACTIVATION LEVEL: 3    MECHANISM OF INJURY:      [] Blunt:     [] Motor vehicle collision       [X] Fall       [] Pedestrian struck	      [] Motorcycle accident      [] Penetrating:     [] Gun shot wound       [] Stab wound    CHIEF COMPLAINT: Patient is a 89y old  Male who presents with a chief complaint of mechanical fall.    HPI: 89M w/ PMHx of aFib on [facility], presenting to [facility] after suffering a unwitnessed mechanical fall. Patient was walking at home when he felt dizzy and lightheaded and fell forward hitting his face. Unknown LOC. Patient taken to the ED for further evaluation. Wife expressed that patient had been feeling dizzy and lightheaded for the past day.    In the ED, patient was afebrile, hemodynamically stable, labs remarkable for significant electrolyte imbalances with a potassium of 2.6 and acute kidney injury with BUN of 78 and Creatinine of 2.24, CTH and neck revealed comminuted fractures of bilateral nasal bones and soft tissue swelling right sternomastoid muscle with low attenuation collection and fluid level, (6:102), prevertebral soft tissue swelling, fused C5-C7 vertebra, linear lucencies concerning for fractures of C5, C6, C7 vertebra, horizontal fracture through osseous fused anterior longitudinal ligament C5-C6, (605:28, 604:20), correlate for hyperextension injury.  No fevers/chills, nausea/vomiting, chest pain/shortness of breath.  PAST MEDICAL & SURGICAL HISTORY:  History of orchiectomy    Obstructive sleep apnea    Atrial fibrillation    Pericardial effusion    H/O total knee replacement        FAMILY HISTORY:      SOCIAL HISTORY:    Allergies    shellfish (Unknown)  sulfa drugs (Unknown)    Intolerances        Review of Systems:    General:	Denies fatigue, fevers, chills, sweats, decreased appetite.    Skin/Breast: denies pruritis, rash  	  Ophthalmologic: Denies change in vision or blurring  	  HEENT	Denies dry mouth, oral sores, dysphagia,  change in hearing.    Respiratory and Thorax:  Denies cough, sob, wheeze, hemoptysis  	  Cardiovascular:	Denies cp , palp, orthopnea    Gastrointestinal:	Denies n/v/d constipation    Genitourinary:	Denies dysuria of frequency, hematuria, flank pain    Musculoskeletal:	 + bone or joint pain, muscle aches.     Neurological:	 Denies change in sensory or motor function, headache, weakness.     Psychiatric:	Denies depression, anxiety, insomnia.     Hematology/Lymphatics:  + bleeding or bruising  	    SUBJECTIVE / OVERNIGHT EVENTS:  pt states he was getting out of bed at home and slipped. pt denies LOC. states he fell forward and his his face. denies cp sob.         Vital Signs Last 24 Hrs  T(C): 36.4 (2020 07:39), Max: 36.8 (2020 12:40)  T(F): 97.5 (2020 07:39), Max: 98.2 (2020 12:40)  HR: 70 (2020 07:39) (65 - 98)  BP: 129/73 (2020 07:39) (109/68 - 146/79)  BP(mean): --  RR: 17 (2020 07:39) (17 - 20)  SpO2: 99% (2020 07:39) (97% - 100%)  I&O's Summary    2020 07:01  -  2020 07:00  --------------------------------------------------------  IN: 850 mL / OUT: 295 mL / NET: 555 mL        PHYSICAL EXAM:  GENERAL: NAD, Comfortable  HEAD:  stitches right eyebrow and nasal bridge, ecchymosis nose and eyes  NECK: c collar  CHEST/LUNG: Clear to auscultation bilaterally; No wheeze  HEART: Regular rate and rhythm; No murmurs, rubs, or gallops  ABDOMEN: Soft, Nontender, Nondistended; Bowel sounds present  Neuro: AAOx3, no focal deficit, 5/5 b/l extremities  EXTREMITIES:  2+ Peripheral Pulses, trace le edema  SKIN: No rashes or lesions    LABS:                        9.4    3.56  )-----------( 111      ( 2020 06:30 )             28.5     11-14    135  |  94<L>  |  52<H>  ----------------------------<  92  3.0<L>   |  28  |  1.36<H>    Ca    8.4      2020 06:30  Phos  2.3     11-14  Mg     1.6         TPro  5.6<L>  /  Alb  2.9<L>  /  TBili  0.8  /  DBili  x   /  AST  26  /  ALT  19  /  AlkPhos  53  11-14    PT/INR - ( 2020 11:45 )   PT: 16.6 sec;   INR: 1.41 ratio         PTT - ( 2020 11:45 )  PTT:31.9 sec  CAPILLARY BLOOD GLUCOSE      POCT Blood Glucose.: 188 mg/dL (2020 11:03)        Urinalysis Basic - ( 2020 19:29 )    Color: Light Yellow / Appearance: Clear / S.015 / pH: x  Gluc: x / Ketone: Negative  / Bili: Negative / Urobili: Negative   Blood: x / Protein: Negative / Nitrite: Negative   Leuk Esterase: Negative / RBC: 0 /hpf / WBC 0 /HPF   Sq Epi: x / Non Sq Epi: 0 /hpf / Bacteria: Negative        RADIOLOGY & ADDITIONAL TESTS:    Imaging Personally Reviewed:  [x] YES  [ ] NO    Consultant(s) Notes Reviewed:  [x] YES  [ ] NO      MEDICATIONS  (STANDING):  aMIOdarone    Tablet 200 milliGRAM(s) Oral daily  colchicine 0.6 milliGRAM(s) Oral daily  furosemide    Tablet 20 milliGRAM(s) Oral daily  lactated ringers. 1000 milliLiter(s) (100 mL/Hr) IV Continuous <Continuous>  metolazone 5 milliGRAM(s) Oral daily  predniSONE   Tablet 10 milliGRAM(s) Oral daily  tamsulosin 0.4 milliGRAM(s) Oral at bedtime    MEDICATIONS  (PRN):      Care Discussed with Consultants/Other Providers [x] YES  [ ] NO    HEALTH ISSUES - PROBLEM Dx:

## 2020-11-14 NOTE — PROGRESS NOTE ADULT - SUBJECTIVE AND OBJECTIVE BOX
Patient seen and examined at bedside.    --Anticoagulation--    T(C): 36.4 (11-14-20 @ 00:39), Max: 36.8 (11-13-20 @ 12:40)  HR: 98 (11-14-20 @ 00:39) (65 - 98)  BP: 132/73 (11-14-20 @ 00:39) (109/68 - 146/79)  RR: 17 (11-14-20 @ 00:39) (17 - 20)  SpO2: 97% (11-14-20 @ 00:39) (97% - 100%)  Wt(kg): --    Exam:  Intact    Labs:                         11.3   3.71  )-----------( 136      ( 13 Nov 2020 11:45 )             34.5     11-13    132<L>  |  86<L>  |  78<H>  ----------------------------<  189<H>  2.6<LL>   |  30  |  2.24<H>    Ca    9.4      13 Nov 2020 11:45    TPro  7.1  /  Alb  3.8  /  TBili  0.7  /  DBili  x   /  AST  28  /  ALT  24  /  AlkPhos  70  11-13    PT/INR - ( 13 Nov 2020 11:45 )   PT: 16.6 sec;   INR: 1.41 ratio         PTT - ( 13 Nov 2020 11:45 )  PTT:31.9 sec

## 2020-11-14 NOTE — CONSULT NOTE ADULT - SUBJECTIVE AND OBJECTIVE BOX
CARDIOLOGY CONSULT - Dr. Michael  PH coverage     CHIEF COMPLAINT: syncope      HPI:  89M w/ PMHx of aFib on Xagrace, HAYLEE, presenting to [facility] after suffering a unwitnessed mechanical fall. Patient was walking at home when he felt dizzy and lightheaded and fell forward hitting his face. Unknown LOC. Patient taken to the ED for further evaluation. Wife expressed that patient had been feeling dizzy and lightheaded for the past day.    In the ED, patient was afebrile, hemodynamically stable, labs remarkable for significant electrolyte imbalances with a potassium of 2.6 and acute kidney injury with BUN of 78 and Creatinine of 2.24, CTH and neck revealed comminuted fractures of bilateral nasal bones and soft tissue swelling right sternomastoid muscle with low attenuation collection and fluid level, (6:102), prevertebral soft tissue swelling, fused C5-C7 vertebra, linear lucencies concerning for fractures of C5, C6, C7 vertebra, horizontal fracture through osseous fused anterior longitudinal ligament C5-C6, (605:28, 604:20), correlate for hyperextension injury.  Patient denies any chest pain, dyspnea, palpitations, cough, syncope, edema, exertional symptoms, nausea, abdominal pain, fever, chills,  or rash.      PAST MEDICAL & SURGICAL HISTORY:  History of orchiectomy    Obstructive sleep apnea    Atrial fibrillation    Pericardial effusion    H/O total knee replacement      PREVIOUS DIAGNOSTIC TESTING:    [ ] Echocardiogram:   < from: Limited Transthoracic Echo (04.23.12 @ 08:52) >  CONCLUSIONS:  1. Normal left ventricular systolic function. EF 65%. No  segmental wall motion abnormalities.  2. Thickened pericardium. Minimal residual pericardial  effusion.  3. Left pleural effusion.    < end of copied text >  [ ]  Catheterization:   [ ] Stress Test:  	    MEDICATIONS:  HOME MEDICATIONS:  amiodarone 200 mg oral tablet: 1 tab(s) orally once a day (13 Nov 2020 17:16)  colchicine 0.6 mg oral tablet: 1 tab(s) orally once a day (13 Nov 2020 17:16)  Eliquis 2.5 mg oral tablet: 1 tab(s) orally once a day (13 Nov 2020 17:16)  gabapentin 100 mg oral tablet: 1 tab(s) orally once a day (13 Nov 2020 17:16)  Lasix 20 mg oral tablet: 1 tab(s) orally once a day (13 Nov 2020 17:16)  metOLazone 5 mg oral tablet: 1 tab(s) orally once a day (13 Nov 2020 17:16)  predniSONE 10 mg oral tablet: 1 tab(s) orally once a day (13 Nov 2020 17:16)  tamsulosin 0.4 mg oral capsule: 1 cap(s) orally once a day (13 Nov 2020 17:16)      MEDICATIONS  (STANDING):  aMIOdarone    Tablet 200 milliGRAM(s) Oral daily  colchicine 0.6 milliGRAM(s) Oral daily  furosemide    Tablet 20 milliGRAM(s) Oral daily  lactated ringers. 1000 milliLiter(s) (100 mL/Hr) IV Continuous <Continuous>  metolazone 5 milliGRAM(s) Oral daily  predniSONE   Tablet 10 milliGRAM(s) Oral daily  tamsulosin 0.4 milliGRAM(s) Oral at bedtime          FAMILY HISTORY:      SOCIAL HISTORY:    [x] Non-smoker  [ ] Smoker  [ ] Alcohol    Allergies    shellfish (Unknown)  sulfa drugs (Unknown)    Intolerances    	    REVIEW OF SYSTEMS:  CONSTITUTIONAL: No fever, weight loss, or fatigue  EYES: No eye pain, visual disturbances, or discharge  ENMT:  No difficulty hearing, tinnitus, vertigo; No sinus or throat pain  NECK: No pain or stiffness,   RESPIRATORY: No cough, wheezing, chills or hemoptysis; No Shortness of Breath  CARDIOVASCULAR: No chest pain, palpitations, passing out, dizziness, or leg swelling  GASTROINTESTINAL: No abdominal or epigastric pain. No nausea, vomiting, or hematemesis; No diarrhea or constipation. No melena or hematochezia.  GENITOURINARY: No dysuria, frequency, hematuria, or incontinence  NEUROLOGICAL: No headaches, memory loss, loss of strength, numbness, or tremors  SKIN: No itching, burning, rashes, or lesions   	    [x ] All others negative	- SEE HPI   [ ] Unable to obtain    PHYSICAL EXAM:  T(C): 36.4 (11-14-20 @ 07:39), Max: 36.8 (11-13-20 @ 12:40)  HR: 70 (11-14-20 @ 07:39) (65 - 98)  BP: 129/73 (11-14-20 @ 07:39) (109/68 - 146/79)  RR: 17 (11-14-20 @ 07:39) (17 - 20)  SpO2: 99% (11-14-20 @ 07:39) (97% - 100%)  Wt(kg): --  I&O's Summary    13 Nov 2020 07:01  -  14 Nov 2020 07:00  --------------------------------------------------------  IN: 850 mL / OUT: 295 mL / NET: 555 mL        Appearance: Normal	  Psychiatry: A & O x 3, Mood & affect appropriate  HEENT:   Normal oral mucosa, PERRL, EOMI, ccollar in place 	  Lymphatic: No lymphadenopathy  Cardiovascular: Normal S1 S2,RRR, No JVD, No murmurs  Respiratory: Lungs clear to auscultation	  Gastrointestinal:  Soft, Non-tender, + BS	  Skin: No rashes, No ecchymoses, No cyanosis	  Neurologic: Non-focal   Extremities: Normal range of motion, No clubbing, cyanosis or edema  Vascular: Peripheral pulses palpable 2+ bilaterally    TELEMETRY: 	    ECG:  vpaced 69 	  RADIOLOGY:   OTHER: 	  < from: CT Chest No Cont (11.13.20 @ 14:28) >  IMPRESSION:  No acute pathology. Specifically, no retroperitoneal hemorrhage.    Chronic left pleural effusion with rounded atelectasis in the lingula and left lower lobe.    < end of copied text >  	  LABS:	 	    CARDIAC MARKERS:                                  9.4    3.56  )-----------( 111      ( 14 Nov 2020 06:30 )             28.5     11-14    135  |  94<L>  |  52<H>  ----------------------------<  92  3.0<L>   |  28  |  1.36<H>    Ca    8.4      14 Nov 2020 06:30  Phos  2.3     11-14  Mg     1.6     11-14    TPro  5.6<L>  /  Alb  2.9<L>  /  TBili  0.8  /  DBili  x   /  AST  26  /  ALT  19  /  AlkPhos  53  11-14    PT/INR - ( 13 Nov 2020 11:45 )   PT: 16.6 sec;   INR: 1.41 ratio         PTT - ( 13 Nov 2020 11:45 )  PTT:31.9 sec  proBNP:   Lipid Profile:   HgA1c:   TSH:

## 2020-11-14 NOTE — PHYSICAL THERAPY INITIAL EVALUATION ADULT - ACTIVE RANGE OF MOTION EXAMINATION, REHAB EVAL
bilateral upper extremity Active ROM was WFL (within functional limits)/bilateral  lower extremity Active ROM was WFL (within functional limits)/L sh N/a 2/2 to pt declined

## 2020-11-14 NOTE — CONSULT NOTE ADULT - SUBJECTIVE AND OBJECTIVE BOX
Patient evaluated yesterday evening 11/13/2020; consulted for B nasal bone fractures s/p fall. Patient with afib and on Eliquis. +C-spine fracture.   Full consult note to be dictated.  Patient examined and CT scan reviewed.  Exam c/w no clinical evidence of septal hematoma, nasal dorsum grossly midline, moderate edema; patient moving air B nares per his report; lacerations with sutures by ER team  Plan: no immediate intervention B nasal bone fractures; no clinical evidence of septal hematoma; my contact information was given to the patient for f/u in one week  Please call [phone] with additional concerns.

## 2020-11-14 NOTE — CONSULT NOTE ADULT - ASSESSMENT
89M w/ PMHx of aFib on Xagrace, HAYLEE, presenting to [facility] after suffering a unwitnessed mechanical fall.     1. 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 multi level degen and foraminal stenosis. HCT negative, T/L spine negative for fx.   -trauma/neuro surg f/u   -MRI pending  -AC on hold in setting of traumatic fall , drop in h/h noted  - management per trauma surg    2. AFIB, hx  -stable  -AC on hold in setting of traumatic fall , drop in h/h noted     dvt ppx

## 2020-11-15 LAB
ANION GAP SERPL CALC-SCNC: 11 MMOL/L — SIGNIFICANT CHANGE UP (ref 5–17)
APTT BLD: 31.8 SEC — SIGNIFICANT CHANGE UP (ref 27.5–35.5)
BUN SERPL-MCNC: 52 MG/DL — HIGH (ref 7–23)
CALCIUM SERPL-MCNC: 8.9 MG/DL — SIGNIFICANT CHANGE UP (ref 8.4–10.5)
CHLORIDE SERPL-SCNC: 96 MMOL/L — SIGNIFICANT CHANGE UP (ref 96–108)
CO2 SERPL-SCNC: 31 MMOL/L — SIGNIFICANT CHANGE UP (ref 22–31)
CREAT SERPL-MCNC: 1.61 MG/DL — HIGH (ref 0.5–1.3)
GLUCOSE SERPL-MCNC: 115 MG/DL — HIGH (ref 70–99)
HCT VFR BLD CALC: 31.4 % — LOW (ref 39–50)
HGB BLD-MCNC: 10.2 G/DL — LOW (ref 13–17)
INR BLD: 1.35 RATIO — HIGH (ref 0.88–1.16)
MAGNESIUM SERPL-MCNC: 1.8 MG/DL — SIGNIFICANT CHANGE UP (ref 1.6–2.6)
MCHC RBC-ENTMCNC: 31.3 PG — SIGNIFICANT CHANGE UP (ref 27–34)
MCHC RBC-ENTMCNC: 32.5 GM/DL — SIGNIFICANT CHANGE UP (ref 32–36)
MCV RBC AUTO: 96.3 FL — SIGNIFICANT CHANGE UP (ref 80–100)
NRBC # BLD: 0 /100 WBCS — SIGNIFICANT CHANGE UP (ref 0–0)
PHOSPHATE SERPL-MCNC: 4.9 MG/DL — HIGH (ref 2.5–4.5)
PLATELET # BLD AUTO: 101 K/UL — LOW (ref 150–400)
POTASSIUM SERPL-MCNC: 3.1 MMOL/L — LOW (ref 3.5–5.3)
POTASSIUM SERPL-SCNC: 3.1 MMOL/L — LOW (ref 3.5–5.3)
PROTHROM AB SERPL-ACNC: 15.7 SEC — HIGH (ref 10.6–13.6)
RBC # BLD: 3.26 M/UL — LOW (ref 4.2–5.8)
RBC # FLD: 15.6 % — HIGH (ref 10.3–14.5)
SARS-COV-2 IGG SERPL QL IA: NEGATIVE — SIGNIFICANT CHANGE UP
SARS-COV-2 IGM SERPL IA-ACNC: <0.1 INDEX — SIGNIFICANT CHANGE UP
SODIUM SERPL-SCNC: 138 MMOL/L — SIGNIFICANT CHANGE UP (ref 135–145)
WBC # BLD: 4.12 K/UL — SIGNIFICANT CHANGE UP (ref 3.8–10.5)
WBC # FLD AUTO: 4.12 K/UL — SIGNIFICANT CHANGE UP (ref 3.8–10.5)

## 2020-11-15 PROCEDURE — 99232 SBSQ HOSP IP/OBS MODERATE 35: CPT

## 2020-11-15 RX ORDER — POTASSIUM CHLORIDE 20 MEQ
20 PACKET (EA) ORAL
Refills: 0 | Status: COMPLETED | OUTPATIENT
Start: 2020-11-15 | End: 2020-11-15

## 2020-11-15 RX ORDER — DEXTROSE MONOHYDRATE, SODIUM CHLORIDE, AND POTASSIUM CHLORIDE 50; .745; 4.5 G/1000ML; G/1000ML; G/1000ML
1000 INJECTION, SOLUTION INTRAVENOUS
Refills: 0 | Status: DISCONTINUED | OUTPATIENT
Start: 2020-11-16 | End: 2020-11-16

## 2020-11-15 RX ORDER — ENOXAPARIN SODIUM 100 MG/ML
40 INJECTION SUBCUTANEOUS DAILY
Refills: 0 | Status: DISCONTINUED | OUTPATIENT
Start: 2020-11-15 | End: 2020-11-19

## 2020-11-15 RX ORDER — MAGNESIUM SULFATE 500 MG/ML
2 VIAL (ML) INJECTION ONCE
Refills: 0 | Status: COMPLETED | OUTPATIENT
Start: 2020-11-15 | End: 2020-11-15

## 2020-11-15 RX ADMIN — Medication 20 MILLIEQUIVALENT(S): at 21:34

## 2020-11-15 RX ADMIN — Medication 50 GRAM(S): at 18:42

## 2020-11-15 RX ADMIN — Medication 20 MILLIGRAM(S): at 05:41

## 2020-11-15 RX ADMIN — AMIODARONE HYDROCHLORIDE 200 MILLIGRAM(S): 400 TABLET ORAL at 05:41

## 2020-11-15 RX ADMIN — TAMSULOSIN HYDROCHLORIDE 0.4 MILLIGRAM(S): 0.4 CAPSULE ORAL at 21:34

## 2020-11-15 RX ADMIN — ENOXAPARIN SODIUM 40 MILLIGRAM(S): 100 INJECTION SUBCUTANEOUS at 11:13

## 2020-11-15 RX ADMIN — Medication 0.6 MILLIGRAM(S): at 11:14

## 2020-11-15 RX ADMIN — Medication 20 MILLIEQUIVALENT(S): at 17:52

## 2020-11-15 RX ADMIN — Medication 20 MILLIEQUIVALENT(S): at 19:55

## 2020-11-15 RX ADMIN — Medication 10 MILLIGRAM(S): at 05:41

## 2020-11-15 NOTE — PROGRESS NOTE ADULT - SUBJECTIVE AND OBJECTIVE BOX
Patient is a 89y old  Male who presents with a chief complaint of s/p fall (2020 11:03)      SUBJECTIVE / OVERNIGHT EVENTS:    Patient seen and examined. denies neck pain. denies cp sob.      Vital Signs Last 24 Hrs  T(C): 36.4 (15 Nov 2020 07:48), Max: 36.6 (2020 15:44)  T(F): 97.5 (15 Nov 2020 07:48), Max: 97.8 (2020 15:44)  HR: 71 (15 Nov 2020 09:26) (69 - 73)  BP: 126/74 (15 Nov 2020 07:48) (117/70 - 146/70)  BP(mean): --  RR: 18 (15 Nov 2020 07:48) (16 - 18)  SpO2: 98% (15 Nov 2020 09:26) (97% - 100%)  I&O's Summary    2020 07:01  -  15 Nov 2020 07:00  --------------------------------------------------------  IN: 1880 mL / OUT: 600 mL / NET: 1280 mL    15 Nov 2020 07:01  -  15 Nov 2020 10:31  --------------------------------------------------------  IN: 120 mL / OUT: 0 mL / NET: 120 mL        PE:  GENERAL: NAD, Comfortable  HEAD:  stitches right eyebrow and nasal bridge, ecchymosis nose and eyes  NECK: c collar, echymosis  CHEST/LUNG: Clear to auscultation bilaterally; No wheeze  HEART: Regular rate and rhythm; No murmurs, rubs, or gallops  ABDOMEN: Soft, Nontender, Nondistended; Bowel sounds present  Neuro: AAOx3, no focal deficit, 5/5 b/l extremities  EXTREMITIES:  2+ Peripheral Pulses, trace le edema  SKIN: No rashes or lesions    LABS:                        10.2   4.12  )-----------( 101      ( 15 Nov 2020 06:30 )             31.4     11-15    138  |  96  |  52<H>  ----------------------------<  115<H>  3.1<L>   |  31  |  1.61<H>    Ca    8.9      15 Nov 2020 06:30  Phos  4.9     11-15  Mg     1.8     11-15    TPro  5.6<L>  /  Alb  2.9<L>  /  TBili  0.8  /  DBili  x   /  AST  26  /  ALT  19  /  AlkPhos  53  11-14    PT/INR - ( 15 Nov 2020 08:14 )   PT: 15.7 sec;   INR: 1.35 ratio         PTT - ( 15 Nov 2020 08:14 )  PTT:31.8 sec  CAPILLARY BLOOD GLUCOSE            Urinalysis Basic - ( 2020 19:29 )    Color: Light Yellow / Appearance: Clear / S.015 / pH: x  Gluc: x / Ketone: Negative  / Bili: Negative / Urobili: Negative   Blood: x / Protein: Negative / Nitrite: Negative   Leuk Esterase: Negative / RBC: 0 /hpf / WBC 0 /HPF   Sq Epi: x / Non Sq Epi: 0 /hpf / Bacteria: Negative        RADIOLOGY & ADDITIONAL TESTS:    Imaging Personally Reviewed:  [x] YES  [ ] NO    Consultant(s) Notes Reviewed:  [x] YES  [ ] NO    MEDICATIONS  (STANDING):  aMIOdarone    Tablet 200 milliGRAM(s) Oral daily  colchicine 0.6 milliGRAM(s) Oral daily  enoxaparin Injectable 40 milliGRAM(s) SubCutaneous daily  furosemide    Tablet 20 milliGRAM(s) Oral daily  lactated ringers. 1000 milliLiter(s) (100 mL/Hr) IV Continuous <Continuous>  metolazone 5 milliGRAM(s) Oral daily  predniSONE   Tablet 10 milliGRAM(s) Oral daily  tamsulosin 0.4 milliGRAM(s) Oral at bedtime    MEDICATIONS  (PRN):      Care Discussed with Consultants/Other Providers [x] YES  [ ] NO    HEALTH ISSUES - PROBLEM Dx:

## 2020-11-15 NOTE — PROGRESS NOTE ADULT - ASSESSMENT
89M presenting s/p mechanical fall found to have nasal bone fracture cervical spine fractures, and right SCM hematoma. Patient is with C collar, neck immobilized, facial lacerations sutured, and hemodynamically stable.    PLAN:    - Advance diet as tolerated  - Pain control   - DVT PPx: Lovenox  - Appreciate Neuro recs: C- collar all time.  - MRI C-spine, Patient's pacemaker compatible with MRI  - Appreciate Plastic's recs: No immediate intervention for nasal bone fracture. Patient can f/u as outpatient one week after discharge.  - Isaiah improving, downtrending, renal following         Acute Care Surgery   [id number]

## 2020-11-15 NOTE — PROGRESS NOTE ADULT - ASSESSMENT
89M PMH AFib on xarelto, HAYLEE presenting s/p unwitnessed mechanical fall found to have nasal bone fracture, cervical spine fractures, and right SCM hematoma. medicine consult for co management.    # AFib  # HAYLEE  # unwitnessed mechanical fall  # nasal bone fx  # C5-7 fx  # NURYS    appreciate trauma recs  ro syncope, pt denies LOC but unwitnessed  tele, TTE, check orthostatics  cardio following  holding AC  ICD interrogation  appreciate NSG recs, pending MRI cervical  PT once cleared  hypokalemia hypomag supplement  plastics no acute intervention  NURYS improved, likely baseline, renal following    PCP Dr. Steven Goldberg    Please call [facility] with questions [phone].

## 2020-11-15 NOTE — PROGRESS NOTE ADULT - SUBJECTIVE AND OBJECTIVE BOX
SURGERY PROGRESS NOTE    SUBJECTIVE / 24H EVENTS:  Patient seen and examined on morning rounds. No acute events overnight. Tertiary performed and fitted for C-Collar by [facility] yesterday      OBJECTIVE:  VITAL SIGNS:  T(C): 36.4 (11-15-20 @ 07:48), Max: 36.6 (20 @ 15:44)  HR: 71 (11-15-20 @ 09:26) (69 - 73)  BP: 126/74 (11-15-20 @ 07:48) (117/70 - 146/70)  RR: 18 (11-15-20 @ 07:48) (16 - 18)  SpO2: 98% (11-15-20 @ 09:26) (97% - 100%)  Daily     Daily       PHYSICAL EXAM:  General Appearance: Resting comfortably, no acute distress  HEENT: Eye laceration repaired with sutures intact. Neck immobilized with C-collar  Chest: non-labored breathing, no respiratory distress  Abdomen: Soft, non-tender, non-distended, no peritonitis         20 @ 07:01  -  11-15-20 @ 07:00  --------------------------------------------------------  IN:    IV PiggyBack: 500 mL    Lactated Ringers: 1200 mL    Oral Fluid: 180 mL  Total IN: 1880 mL    OUT:    Voided (mL): 600 mL  Total OUT: 600 mL    Total NET: 1280 mL      11-15-20 @ 07:01  -  11-15-20 @ 12:13  --------------------------------------------------------  IN:    Oral Fluid: 120 mL  Total IN: 120 mL    OUT:    Voided (mL): 300 mL  Total OUT: 300 mL    Total NET: -180 mL          LAB VALUES:  11-15    138  |  96  |  52<H>  ----------------------------<  115<H>  3.1<L>   |  31  |  1.61<H>    Ca    8.9      15 Nov 2020 06:30  Phos  4.9     11-15  Mg     1.8     11-15    TPro  5.6<L>  /  Alb  2.9<L>  /  TBili  0.8  /  DBili  x   /  AST  26  /  ALT  19  /  AlkPhos  53  11-14                               10.2   4.12  )-----------( 101      ( 15 Nov 2020 06:30 )             31.4     LIVER FUNCTIONS - ( 2020 06:30 )  Alb: 2.9 g/dL / Pro: 5.6 g/dL / ALK PHOS: 53 U/L / ALT: 19 U/L / AST: 26 U/L / GGT: x           PT/INR - ( 15 Nov 2020 08:14 )   PT: 15.7 sec;   INR: 1.35 ratio         PTT - ( 15 Nov 2020 08:14 )  PTT:31.8 sec        Urinalysis Basic - ( 2020 19:29 )    Color: Light Yellow / Appearance: Clear / S.015 / pH: x  Gluc: x / Ketone: Negative  / Bili: Negative / Urobili: Negative   Blood: x / Protein: Negative / Nitrite: Negative   Leuk Esterase: Negative / RBC: 0 /hpf / WBC 0 /HPF   Sq Epi: x / Non Sq Epi: 0 /hpf / Bacteria: Negative        MICROBIOLOGY:      RADIOLOGY:        MEDICATIONS  (STANDING):  aMIOdarone    Tablet 200 milliGRAM(s) Oral daily  colchicine 0.6 milliGRAM(s) Oral daily  enoxaparin Injectable 40 milliGRAM(s) SubCutaneous daily  furosemide    Tablet 20 milliGRAM(s) Oral daily  lactated ringers. 1000 milliLiter(s) (100 mL/Hr) IV Continuous <Continuous>  metolazone 5 milliGRAM(s) Oral daily  predniSONE   Tablet 10 milliGRAM(s) Oral daily  tamsulosin 0.4 milliGRAM(s) Oral at bedtime    MEDICATIONS  (PRN):

## 2020-11-15 NOTE — PROGRESS NOTE ADULT - ASSESSMENT
89 y/o m with pmhx afib on Xarelto, HAYLEE, BIBEMS s/p unwitnessed fall. Pt states that he slipped and fall on his wet hard wood floor.     NURYS on CKD vs. progressive CKD   Labs reviewed on [facility], last scr 1.6 in 10/2019    Pt admitted with elevated Scr to 2.2   Pt non-oliguric.   NURYS likely pre-renal. Scr slightly elevated today.  continue IVF today.   CT scan without hydronephrosis   Monitor urine output for hematuria   UA without protein or blood. No further w/u needed   Avoid nephrotoxins     Hypokalemia   serum K low   replete PO as need for k < 3.5     HTN  BP controlled    Anemia  Hb stable

## 2020-11-15 NOTE — PROGRESS NOTE ADULT - SUBJECTIVE AND OBJECTIVE BOX
CARDIOLOGY FOLLOW UP - Dr. Michael    CC no cp/sob/hernandez   no dizziness, pt. alert and oriented       PHYSICAL EXAM:  T(C): 36.4 (11-15-20 @ 07:48), Max: 36.6 (11-14-20 @ 15:44)  HR: 73 (11-15-20 @ 07:48) (69 - 73)  BP: 126/74 (11-15-20 @ 07:48) (117/70 - 146/70)  RR: 18 (11-15-20 @ 07:48) (16 - 18)  SpO2: 97% (11-15-20 @ 07:48) (97% - 100%)  Wt(kg): --  I&O's Summary    14 Nov 2020 07:01  -  15 Nov 2020 07:00  --------------------------------------------------------  IN: 1880 mL / OUT: 600 mL / NET: 1280 mL        Appearance: Normal	  Cardiovascular: Normal S1 S2,RRR, No JVD, No murmurs  Respiratory: Lungs clear to auscultation	  Gastrointestinal:  Soft, Non-tender, + BS	  Extremities: Normal range of motion, No clubbing, cyanosis or edema      HOME MEDICATIONS:  amiodarone 200 mg oral tablet: 1 tab(s) orally once a day (13 Nov 2020 17:16)  colchicine 0.6 mg oral tablet: 1 tab(s) orally once a day (13 Nov 2020 17:16)  Eliquis 2.5 mg oral tablet: 1 tab(s) orally once a day (13 Nov 2020 17:16)  gabapentin 100 mg oral tablet: 1 tab(s) orally once a day (13 Nov 2020 17:16)  Lasix 20 mg oral tablet: 1 tab(s) orally once a day (13 Nov 2020 17:16)  metOLazone 5 mg oral tablet: 1 tab(s) orally once a day (13 Nov 2020 17:16)  predniSONE 10 mg oral tablet: 1 tab(s) orally once a day (13 Nov 2020 17:16)  tamsulosin 0.4 mg oral capsule: 1 cap(s) orally once a day (13 Nov 2020 17:16)      MEDICATIONS  (STANDING):  aMIOdarone    Tablet 200 milliGRAM(s) Oral daily  colchicine 0.6 milliGRAM(s) Oral daily  enoxaparin Injectable 40 milliGRAM(s) SubCutaneous daily  furosemide    Tablet 20 milliGRAM(s) Oral daily  lactated ringers. 1000 milliLiter(s) (100 mL/Hr) IV Continuous <Continuous>  metolazone 5 milliGRAM(s) Oral daily  predniSONE   Tablet 10 milliGRAM(s) Oral daily  tamsulosin 0.4 milliGRAM(s) Oral at bedtime      TELEMETRY: of tele  	    ECG:  	  RADIOLOGY:   DIAGNOSTIC TESTING:  [ ] Echocardiogram:  [ ]  Catheterization:  [ ] Stress Test:    OTHER: 	    LABS:	 	                                10.2   4.12  )-----------( 101      ( 15 Nov 2020 06:30 )             31.4     11-15    138  |  96  |  52<H>  ----------------------------<  115<H>  3.1<L>   |  31  |  1.61<H>    Ca    8.9      15 Nov 2020 06:30  Phos  4.9     11-15  Mg     1.8     11-15    TPro  5.6<L>  /  Alb  2.9<L>  /  TBili  0.8  /  DBili  x   /  AST  26  /  ALT  19  /  AlkPhos  53  11-14    PT/INR - ( 13 Nov 2020 11:45 )   PT: 16.6 sec;   INR: 1.41 ratio         PTT - ( 13 Nov 2020 11:45 )  PTT:31.9 sec

## 2020-11-15 NOTE — PROGRESS NOTE ADULT - SUBJECTIVE AND OBJECTIVE BOX
[facility] NEPHROLOGY [facility]ACT[facility]   MD Iron Jackson MD, D.O. Ruoru Wong, PA    From 7 AM - 5 PM:  OFFICE: [phone]  Dr. Ramsay cell: [phone]  Dr. Pichardo cell: [phone]  Dr. Prieto cell: [phone]  KATELYN Reese cell: [phone]    From 5 PM - 7 AM: Answering Service: [phone]  Date of service: 11-15-20 @ 12:28    RENAL FOLLOW UP NOTE  --------------------------------------------------------------------------------  HPI:  Pt seen and examined at bedside.       PAST HISTORY  --------------------------------------------------------------------------------  No significant changes to PMH, PSH, FHx, SHx, unless otherwise noted    ALLERGIES & MEDICATIONS  --------------------------------------------------------------------------------  Allergies    shellfish (Unknown)  sulfa drugs (Unknown)    Intolerances      Standing Inpatient Medications  aMIOdarone    Tablet 200 milliGRAM(s) Oral daily  colchicine 0.6 milliGRAM(s) Oral daily  enoxaparin Injectable 40 milliGRAM(s) SubCutaneous daily  furosemide    Tablet 20 milliGRAM(s) Oral daily  lactated ringers. 1000 milliLiter(s) IV Continuous <Continuous>  metolazone 5 milliGRAM(s) Oral daily  predniSONE   Tablet 10 milliGRAM(s) Oral daily  tamsulosin 0.4 milliGRAM(s) Oral at bedtime    PRN Inpatient Medications      REVIEW OF SYSTEMS  --------------------------------------------------------------------------------  General: no fever  CVS: no chest pain  RESP: no sob, no cough  ABD: no abdominal pain  : no dysuria  MSK: no edema     VITALS/PHYSICAL EXAM  --------------------------------------------------------------------------------  T(C): 36.4 (11-15-20 @ 07:48), Max: 36.6 (11-14-20 @ 15:44)  HR: 71 (11-15-20 @ 09:26) (69 - 73)  BP: 126/74 (11-15-20 @ 07:48) (117/70 - 146/70)  RR: 18 (11-15-20 @ 07:48) (16 - 18)  SpO2: 98% (11-15-20 @ 09:26) (97% - 100%)  Wt(kg): --    Weight (kg): 76.7 (11-14-20 @ 15:44)      11-14-20 @ 07:01  -  11-15-20 @ 07:00  --------------------------------------------------------  IN: 1880 mL / OUT: 600 mL / NET: 1280 mL    11-15-20 @ 07:01  -  11-15-20 @ 12:28  --------------------------------------------------------  IN: 120 mL / OUT: 300 mL / NET: -180 mL      Physical Exam:  	Gen: NAD  	HEENT: MMM neck collar + stitches   	Pulm: CTA B/L  	CV: S1S2  	Abd: Soft, +BS  	Ext: No LE edema B/L                      Neuro: resting  	Skin: Warm and Dry       LABS/STUDIES  --------------------------------------------------------------------------------              10.2   4.12  >-----------<  101      [11-15-20 @ 06:30]              31.4     138  |  96  |  52  ----------------------------<  115      [11-15-20 @ 06:30]  3.1   |  31  |  1.61        Ca     8.9     [11-15-20 @ 06:30]      Mg     1.8     [11-15-20 @ 06:30]      Phos  4.9     [11-15-20 @ 06:30]    TPro  5.6  /  Alb  2.9  /  TBili  0.8  /  DBili  x   /  AST  26  /  ALT  19  /  AlkPhos  53  [11-14-20 @ 06:30]    PT/INR: PT 15.7 , INR 1.35       [11-15-20 @ 08:14]  PTT: 31.8       [11-15-20 @ 08:14]    Creatinine Trend:  SCr 1.61 [11-15 @ 06:30]  SCr 1.36 [11-14 @ 06:30]  SCr 2.24 [11-13 @ 11:45]    Urinalysis - [11-13-20 @ 19:29]      Color Light Yellow / Appearance Clear / SG 1.015 / pH 6.0      Gluc Negative / Ketone Negative  / Bili Negative / Urobili Negative       Blood Negative / Protein Negative / Leuk Est Negative / Nitrite Negative      RBC 0 / WBC 0 / Hyaline 1 / Gran  / Sq Epi  / Non Sq Epi 0 / Bacteria Negative

## 2020-11-15 NOTE — PROGRESS NOTE ADULT - ASSESSMENT
89M w/ PMHx of aFib on HAYLEE Posada, presenting to [facility] after suffering a unwitnessed mechanical fall.     1. 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 multi level degen and foraminal stenosis. HCT negative, T/L spine negative for fx.   -trauma/neuro surg f/u   -MRI pending  -AC on hold in setting of traumatic fall , drop in h/h noted  - management per trauma surg    2. AFIB, hx  -stable  -AC on hold in setting of traumatic fall , drop in h/h noted     dvt ppx

## 2020-11-15 NOTE — CHART NOTE - NSCHARTNOTEFT_GEN_A_CORE
Tertiary Trauma Survey (TTS)    Date of TTS:  2020                           Time: 11:30 am  Admit Date:   2020                           Trauma Activation: 3  Admit GCS: E4    V5     M6     HPI:  GENERAL SURGERY TRAUMA SERVICE   --------------------------------------------------------------------------------------------    TRAUMA ACTIVATION LEVEL: 3    MECHANISM OF INJURY:      [] Blunt:     [] Motor vehicle collision       [X] Fall       [] Pedestrian struck	      [] Motorcycle accident      [] Penetrating:     [] Gun shot wound       [] Stab wound    CHIEF COMPLAINT: Patient is a 89y old  Male who presents with a chief complaint of mechanical fall.    HPI: 89M w/ PMHx of aFib on [facility], presenting to [facility] after suffering a unwitnessed mechanical fall. Patient was walking at home when he felt dizzy and lightheaded and fell forward hitting his face. Unknown LOC. Patient taken to the ED for further evaluation. Wife expressed that patient had been feeling dizzy and lightheaded for the past day.    In the ED, patient was afebrile, hemodynamically stable, labs remarkable for significant electrolyte imbalances with a potassium of 2.6 and acute kidney injury with BUN of 78 and Creatinine of 2.24, CTH and neck revealed comminuted fractures of bilateral nasal bones and soft tissue swelling right sternomastoid muscle with low attenuation collection and fluid level, (6:102), prevertebral soft tissue swelling, fused C5-C7 vertebra, linear lucencies concerning for fractures of C5, C6, C7 vertebra, horizontal fracture through osseous fused anterior longitudinal ligament C5-C6, (605:28, 604:20), correlate for hyperextension injury.  No fevers/chills, nausea/vomiting, chest pain/shortness of breath.    PRIMARY SURVEY:   A - airway intact  B - bilateral breath sounds and good chest rise  C - initial BP  BP: 133/74 (20 @ 15:40), HR HR: 68 (20 @ 15:40), palpable pulses in all extremities  D - GCS 15 on arrival. E 4, V 5, M 6.   Exposure obtained    SECONDARY SURVEY:  General: NAD  HEENT: Normocephalic, EOMI, PEERLA, laceration in right periorbital region s/p suturing in the ED, nasal swelling  Neck: Soft, midline trachea, C-collar in place  Chest: No chest wall tenderness  Cardiac: S1, S2, RRR  Respiratory: Bilateral breath sounds clear and equal   Abdomen: Soft, non-distended, non-tender; no rebound or guarding; no palpable masses   Groin: Normal appearing  Extremities: Palpable radial & DP pulses bilaterally. Motor and sensory grossly intact in all 4 extremities.  Back: No TTP; no palpable runoff/stepoff/deformity    ROS: 10-system review all negative except as noted in HPI.   (2020 16:44)      PAST MEDICAL & SURGICAL HISTORY:  History of orchiectomy    Obstructive sleep apnea    Atrial fibrillation    Pericardial effusion    H/O total knee replacement      [  ] No significant past history as reviewed with the patient and family    FAMILY HISTORY:    [  ] Family history not pertinent as reviewed with the patient and family    SOCIAL HISTORY:    Medications (inpatient): aMIOdarone    Tablet 200 milliGRAM(s) Oral daily  colchicine 0.6 milliGRAM(s) Oral daily  furosemide    Tablet 20 milliGRAM(s) Oral daily  lactated ringers. 1000 milliLiter(s) IV Continuous <Continuous>  metolazone 5 milliGRAM(s) Oral daily  predniSONE   Tablet 10 milliGRAM(s) Oral daily  tamsulosin 0.4 milliGRAM(s) Oral at bedtime    Medications (PRN):  Allergies: shellfish (Unknown)  sulfa drugs (Unknown)  (Intolerances: )    Vital Signs Last 24 Hrs  T(C): 36.4 (2020 23:42), Max: 36.6 (2020 15:44)  T(F): 97.6 (2020 23:42), Max: 97.8 (2020 15:44)  HR: 71 (15 Nov 2020 00:26) (69 - 72)  BP: 146/70 (2020 23:42) (125/77 - 146/70)  BP(mean): --  RR: 16 (2020 23:42) (16 - 17)  SpO2: 98% (15 Nov 2020 00:26) (97% - 100%)  Drug Dosing Weight  Height (cm): 177.8 (2020 10:54)  Weight (kg): 76.7 (2020 15:44)  BMI (kg/m2): 24.3 (2020 15:44)  BSA (m2): 1.94 (2020 15:44)                          9.4    3.56  )-----------( 111      ( 2020 06:30 )             28.5         135  |  94<L>  |  52<H>  ----------------------------<  92  3.0<L>   |  28  |  1.36<H>    Ca    8.4      2020 06:30  Phos  2.3       Mg     1.6         TPro  5.6<L>  /  Alb  2.9<L>  /  TBili  0.8  /  DBili  x   /  AST  26  /  ALT  19  /  AlkPhos  53  14    PT/INR - ( 2020 11:45 )   PT: 16.6 sec;   INR: 1.41 ratio         PTT - ( 2020 11:45 )  PTT:31.9 sec  Urinalysis Basic - ( 2020 19:29 )    Color: Light Yellow / Appearance: Clear / S.015 / pH: x  Gluc: x / Ketone: Negative  / Bili: Negative / Urobili: Negative   Blood: x / Protein: Negative / Nitrite: Negative   Leuk Esterase: Negative / RBC: 0 /hpf / WBC 0 /HPF   Sq Epi: x / Non Sq Epi: 0 /hpf / Bacteria: Negative    List Injuries Identified to Date:  C5, C6, and C7 oblique vertebral fracture. Facial lacerations, s/p suture. And Nasal bone fracture.    List Operative and Interventional Radiological Procedures:     Consults (Date):  [x] Neurosurgery:    [x] Cardiology:   [x] Plastics:   [x] Nephrology:   [x] Internal medicine:   [x] PT:     RADIOLOGICAL FINDINGS REVIEW:  < from: Xray Chest 1 View AP/PA (20 @ 11:32) >      EXAM:  XR CHEST AP OR PA 1V                            PROCEDURE DATE:  2020        INTERPRETATION:  CLINICAL INDICATION: Trauma, chest pain    TECHNIQUE: Portable frontal view of the chest.    COMPARISON: Frontal chest radiograph from 2012.    FINDINGS:    Left chest wall pacemaker device.  Cardiac silhouette is within normal limits.  Left pleural effusion. The right lung is clear. No pneumothorax.  Left reverse hemiarthroplasty.    IMPRESSION:    Left pleural effusion, hemothorax is a consideration in the setting of trauma. Correlation with chest CT already ordered is recommended for further evaluation.    < end of copied text >    < from: Xray Elbow AP + Lateral + Oblique, Right (20 @ 11:35) >      EXAM:  ELBOW RIGHT (3 VIEWS)                            PROCEDURE DATE:  2020        INTERPRETATION:  CLINICAL INDICATION: Trauma. Right elbow hematoma    EXAM: AP and lateral views of the right elbow    COMPARISON: None      IMPRESSION:  No acute fracture or dislocation. Mild elbow joint space narrowing. No elbow joint effusion. There is olecranon enthesopathy. Soft tissue intensity containing foci of mineralization are present in the region of the olecranon bursa. This favors sequela of a chronic bursitis, which can be seen in the setting of crystalline such as gout, versus repeat trauma versus less likely infectious or neoplastic.    < end of copied text >    < from: CT Head No Cont (20 @ 14:27) >    EXAM:  CT BRAIN                          EXAM:  CT CERVICAL SPINE                          EXAM:  CT MAXILLOFACIAL                          EXAM:  CT 3D RECONSTRUCT W MASHA                            PROCEDURE DATE:  2020            INTERPRETATION:  CT BRAIN, MAXILLOFACIAL WITH 3-D RECONSTRUCTIONS, CERVICAL SPINE WITHOUT CONTRAST    INDICATIONS:  Trauma code, 89-year-old male, idiopathic pericarditis, acute renal failure, anemia, atrial fibrillation,face forward    TECHNIQUE:    Head CT: Serial axial images were obtained from the skull base to the vertex without the use of contrast.    Maxillofacial CT: Multiple contiguous axial images were obtained through the facial bones without the use of intravenous contrast. Sagittal and coronal reformatted images through the facial bones were performed. Dedicated 3-D images were made using dedicated 3D software and viewed on a dedicated 3D workstation in multiple planes.    Cervical spine: Multiple contiguous axial sections were obtained from the skull base through the T1 level without the administration of intravenous contrast followed by sagittal and coronal reformatted images.    COMPARISON EXAM: Head CT, 10/3/2011, CT cervical spine, 10/21/2007.    FINDINGS:  Ventricles and sulci: Moderate to marked enlargement of ventricles and sulci consistent with progressive moderate to marked volume loss increased from 10/30/2011.    Intra-axial: Redemonstration  foci of decreased attenuation  in  white matter likely  microvascular disease with remote lacunar infarct  left caudate head, with age indeterminate lacunar infarcts new from prior. No new intracranial mass, acute hemorrhage, or midline shift.  Extra-axial: No new extra-axial fluid collection is identified.  Calvarium: Unchanged.    Maxillofacial CT:    Facial Bones: Facial fracture deformities including and not limited to:    Bilateral comminuted displaced bilateral nasal bone fractures with medial displacement the comminuted fracture right nasal bone and lateral displacement and overriding of the fracture fragments of the left nasal bone (10:97).    Fracture deformity anterior nasal septum (10:99), with soft tissue swelling along the anterior nasal septum extending to the columella, correlate visual inspection fornasal septal hematoma.    Bony irregularity anterior nasomaxillary spine (10:72), nondisplaced fracture not excluded.    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    Dental implant hardware streak artifact limits or cavity assessment. 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 Multiple foci of lucency along teeth and implants including bony dehiscence along the left anterior mandibular body, involving lingual and buccal cortex (10:35) correlate with dental inspection for dental disease.    Bone paste at maxillary antra floors for dental implant placement, implants extend into maxillary sinuses,  periapical lucency adjacent to molar and premolar teeth (10:59), extending to sinuses with mucosal thickening, odontogenic sinusitis is not excluded.    Sinonasal Cavities: Mucosal thickening, with retention cyst or polyps in the maxillary sinuses contiguous periapical lucency, (10:60). Opacification of ethmoid air cells anterior rightward nasal septal deviation fractures as above involving nasal bones and anterior nasal septum, nasal septal hematoma not excluded. Right nasal septal osseous spur abuts right middle and inferior turbinate retreating airflow in right middle and inferior meati. Asymmetric pneumatization of right greater than left pterygoid plates likely anatomic variant with mucosal thickening in sphenoid and frontal sinuses.    Orbital Contents: Absent orbital lenses with cataract surgery, orbital globes appear intact, lenses are not dislocated.    Miscellaneous: Decreased pneumatization sclerosis mastoid tips. Bilateral hearing aids in situ with streak artifact.    Cervical spine:    Prevertebral soft tissue swelling, and asymmetric soft tissue swelling of the right sternocleidomastoid muscle containing a low-attenuation collection and loss of surrounding fat planes (excluding 104, 5:104), developing hematoma not excluded.    Fused C5-C7 vertebral bodies,  splaying  C5-C7 spinous processes, 3 mm anterolisthesis of C4 on C5. Anterior osteophytosis with fusion C5-C7 vertebra linear lucency, superior C5 endplate, (605:28) extending inferiorly concerning for acute fracture with linear fracture of fused anterior osteophytosis at C5-6, with fracture through fused anterior longitudinal ligament at C5-6 (604:21). Linear horizontal fracture extending through superior endplate at C6 posteriorly inferiorly and C6 vertebra (605:27). Bony irregularity superior C7 endplate, concerning for age indeterminate fracture, correlate for acute hyperextension injury. There is no  significant bony retropulsion into the spinal canal.    On  coronal reformations, occipital condyles demonstrate well-corticated  likely chronic degenerative change, lateral masses of C1 align appropriately with C2. Multilevel degenerative change with sclerosis,   degenerative osteoarthritis  with osteophytes anteriorly, uncovertebral spurring, and facet joint space compartment narrowing with hypertrophic osteophytes and subchondral sclerosis. Multilevel degenerative disc disease with loss of height and endplate sclerosis. Findings at specific levels as follows:    C2-3, right greater than left uncovertebral and facet hypertrophic changes cause moderate to marked right foraminal narrowing, no significant canal or left foraminal narrowing.    C3-4, right greater than left uncovertebral and facet hypertrophic changes cause marked right and mild to moderate left foraminal narrowing, AP canal measures 9.9 mm.    C4-5, right greater than left uncovertebral and facet hypertrophic changes cause severe right foraminal narrowing likely affecting the exiting right-sided nerve root (5:79), with mild to moderate left foraminal narrowing without significant central canal.    C5-6, bilateral  facet hypertrophic changes,anterior osteophytosis, lucency  through  endplates and disc (604:20) concerning for  fracture   (5:100), with moderate to marked left and mild-to-moderate right foraminal narrowing, without significant canal narrowing.    C6-7, disc osteophyte ridgewith ossification of posterior longitudinal ligament, uncovertebral and facet hypertrophic changes causing mild to moderate bilateral foraminal narrowing, AP canal narrowed to approximately 8.7 mm (605:29). (C7-T1, uncovertebral and facet hypertrophycausing moderate left greater than right foraminal narrowing without central canal narrowing.    Bony irregularity  right anterior clavicle, (5:141) correlate degenerative change or trauma, partially seen pacer wires left anterior chest, extending below  edge of study. Carotid siphon atherosclerotic calcification, heterogeneous thyroid gland, ultrasound may better assess. Partially seen left segment of the right pleural thickening please see chest reports.    IMPRESSION:    Comminuted fractures bilateral nasal bones, overriding left nasal bone fracture fragments, minimally displaced fracture nasomaxillary spine, fracture anterior nasal septum with soft tissue swelling to columella, correlate with visual inspection for nasal septal hematoma.    Intracranially, volume loss, microvascular disease, no large hemorrhage or shift. Basal cisterns appear patent.    Soft tissue swelling right sternomastoid muscle with low attenuation collection and fluid fluid level, (6:102), prevertebral soft tissue swelling, fused C5-C7 vertebra, linear lucencies concerning for fractures of C5, C6, C7 vertebra, horizontal fracture through osseous fused anterior longitudinal ligament C5-C6, (605:28, 604:20), correlate for hyperextension injury.    Multilevel degenerative, foraminal narrowing, ossification posterior longitudinal ligament C5-C7 spinal canal narrowed C6-7 to 8.7 mm, no severe canal compromise or  retropulsion bony fragments to  canal, splaying C5-C7 spinous processes (605:29).    Findings discussed with Dr. Christine in the emergency room at immediate time of review, 2020, at 2:30 PM.    < end of copied text >    < from: CT Chest No Cont (20 @ 14:28) >    EXAM:  CT ABDOMEN AND PELVIS                          EXAM:  CT CHEST                            PROCEDURE DATE:  2020      INTERPRETATION:  CLINICAL INFORMATION: Status post fall. Patient on anticoagulation.    COMPARISON: CT abdomen pelvis 12/10/2010    PROCEDURE:  CT of the Chest, Abdomen and Pelvis was performed without intravenous contrast.  Intravenous contrast: None.  Oral contrast: None.  Sagittal and coronal reformats were performed.    FINDINGS:  CHEST:  LUNGS AND LARGE AIRWAYS: Patent central airways. Rounded atelectasis in the left lower lobe and lingula.  PLEURA: Small left pleural effusion with loculation and thickening likely on the basis of a chronic effusion.  VESSELS: Atherosclerotic changes of the aorta and coronary arteries.  HEART: Heart size is normal. No pericardial effusion.  MEDIASTINUM AND LYN: No lymphadenopathy.  CHEST WALL AND LOWER NECK: Left chest cardiac device.    ABDOMEN AND PELVIS:  LIVER: Within normal limits.  BILE DUCTS: Normal caliber.  GALLBLADDER: Within normal limits.  SPLEEN: Within normal limits.  PANCREAS: Within normal limits.  ADRENALS: Within normal limits.  KIDNEYS/URETERS: Within normal limits.    BLADDER: Within normal limits.  REPRODUCTIVE ORGANS: Prostate is enlarged.    BOWEL: No bowel obstruction. Colonic diverticulosis.  PERITONEUM: No ascites.  VESSELS: Atherosclerotic changes.  RETROPERITONEUM/LYMPH NODES: No lymphadenopathy. No retroperitoneal hemorrhage.  ABDOMINAL WALL: Within normal limits.  BONES: Degenerative changes. Left shoulder replacement.    IMPRESSION:  No acute pathology. Specifically, no retroperitoneal hemorrhage.    Chronic left pleural effusion with rounded atelectasis in the lingula and left lower lobe.    < end of copied text >    Physical Exam:  General: NAD, resting comfortably  HEENT: PEERLA, laceration in right periorbital region s/p suturing in the ED, nasal swelling. Multiple superficial facial abrasion  Neck: immobilized in C-collar. Known C5-C7 fracture.   Pulmonary: normal resp effort, CTA-B  Cardiovascular: NSR, no murmurs  Abdominal: soft, ND/NT, no organomegaly  Extremities: WWP, normal strength, no clubbing/cyanosis/edema  Neuro: A/O x 3, CNs II-XII grossly intact, normal sensation, no focal deficits      Interpretation of Findings:    89 yo m,  s/p mechanical fall with the following injuries:   -- Cervical spine fractures involving C5,C6, and C7. Neck immobilized with C-collar.  -- Nasal bone fracture.   -- Multiple facial and doris-orbital laceration, s/p suture  -- No additional injury revealed during tertiary survey.       ATP [id number]

## 2020-11-15 NOTE — CHART NOTE - NSCHARTNOTEFT_GEN_A_CORE
Patient with know history of implantable cardiac pacemaker.   Device brand: [employer]for  and seems MRI compatible   Copy of ID card was copied and kept in patient's chart.

## 2020-11-16 DIAGNOSIS — I48.91 UNSPECIFIED ATRIAL FIBRILLATION: ICD-10-CM

## 2020-11-16 DIAGNOSIS — R55 SYNCOPE AND COLLAPSE: ICD-10-CM

## 2020-11-16 LAB
ANION GAP SERPL CALC-SCNC: 9 MMOL/L — SIGNIFICANT CHANGE UP (ref 5–17)
BUN SERPL-MCNC: 45 MG/DL — HIGH (ref 7–23)
CALCIUM SERPL-MCNC: 9 MG/DL — SIGNIFICANT CHANGE UP (ref 8.4–10.5)
CHLORIDE SERPL-SCNC: 97 MMOL/L — SIGNIFICANT CHANGE UP (ref 96–108)
CO2 SERPL-SCNC: 30 MMOL/L — SIGNIFICANT CHANGE UP (ref 22–31)
CREAT SERPL-MCNC: 1.46 MG/DL — HIGH (ref 0.5–1.3)
GLUCOSE SERPL-MCNC: 152 MG/DL — HIGH (ref 70–99)
HCT VFR BLD CALC: 28.6 % — LOW (ref 39–50)
HGB BLD-MCNC: 9.3 G/DL — LOW (ref 13–17)
MAGNESIUM SERPL-MCNC: 2 MG/DL — SIGNIFICANT CHANGE UP (ref 1.6–2.6)
MCHC RBC-ENTMCNC: 31.2 PG — SIGNIFICANT CHANGE UP (ref 27–34)
MCHC RBC-ENTMCNC: 32.5 GM/DL — SIGNIFICANT CHANGE UP (ref 32–36)
MCV RBC AUTO: 96 FL — SIGNIFICANT CHANGE UP (ref 80–100)
NRBC # BLD: 0 /100 WBCS — SIGNIFICANT CHANGE UP (ref 0–0)
PHOSPHATE SERPL-MCNC: 1.9 MG/DL — LOW (ref 2.5–4.5)
PLATELET # BLD AUTO: 92 K/UL — LOW (ref 150–400)
POTASSIUM SERPL-MCNC: 3.3 MMOL/L — LOW (ref 3.5–5.3)
POTASSIUM SERPL-SCNC: 3.3 MMOL/L — LOW (ref 3.5–5.3)
RBC # BLD: 2.98 M/UL — LOW (ref 4.2–5.8)
RBC # FLD: 15.6 % — HIGH (ref 10.3–14.5)
SODIUM SERPL-SCNC: 136 MMOL/L — SIGNIFICANT CHANGE UP (ref 135–145)
WBC # BLD: 3.55 K/UL — LOW (ref 3.8–10.5)
WBC # FLD AUTO: 3.55 K/UL — LOW (ref 3.8–10.5)

## 2020-11-16 PROCEDURE — 71046 X-RAY EXAM CHEST 2 VIEWS: CPT | Mod: 26

## 2020-11-16 PROCEDURE — 99233 SBSQ HOSP IP/OBS HIGH 50: CPT

## 2020-11-16 PROCEDURE — 93280 PM DEVICE PROGR EVAL DUAL: CPT | Mod: 26

## 2020-11-16 RX ORDER — POTASSIUM PHOSPHATE, MONOBASIC POTASSIUM PHOSPHATE, DIBASIC 236; 224 MG/ML; MG/ML
30 INJECTION, SOLUTION INTRAVENOUS ONCE
Refills: 0 | Status: COMPLETED | OUTPATIENT
Start: 2020-11-16 | End: 2020-11-16

## 2020-11-16 RX ORDER — POTASSIUM CHLORIDE 20 MEQ
20 PACKET (EA) ORAL
Refills: 0 | Status: COMPLETED | OUTPATIENT
Start: 2020-11-16 | End: 2020-11-16

## 2020-11-16 RX ADMIN — ENOXAPARIN SODIUM 40 MILLIGRAM(S): 100 INJECTION SUBCUTANEOUS at 12:06

## 2020-11-16 RX ADMIN — Medication 0.6 MILLIGRAM(S): at 12:06

## 2020-11-16 RX ADMIN — Medication 20 MILLIGRAM(S): at 05:37

## 2020-11-16 RX ADMIN — DEXTROSE MONOHYDRATE, SODIUM CHLORIDE, AND POTASSIUM CHLORIDE 115 MILLILITER(S): 50; .745; 4.5 INJECTION, SOLUTION INTRAVENOUS at 02:29

## 2020-11-16 RX ADMIN — Medication 10 MILLIGRAM(S): at 05:37

## 2020-11-16 RX ADMIN — POTASSIUM PHOSPHATE, MONOBASIC POTASSIUM PHOSPHATE, DIBASIC 83.33 MILLIMOLE(S): 236; 224 INJECTION, SOLUTION INTRAVENOUS at 16:27

## 2020-11-16 RX ADMIN — AMIODARONE HYDROCHLORIDE 200 MILLIGRAM(S): 400 TABLET ORAL at 05:37

## 2020-11-16 RX ADMIN — Medication 20 MILLIEQUIVALENT(S): at 18:56

## 2020-11-16 RX ADMIN — Medication 20 MILLIEQUIVALENT(S): at 13:33

## 2020-11-16 RX ADMIN — TAMSULOSIN HYDROCHLORIDE 0.4 MILLIGRAM(S): 0.4 CAPSULE ORAL at 21:38

## 2020-11-16 RX ADMIN — Medication 20 MILLIEQUIVALENT(S): at 16:59

## 2020-11-16 NOTE — PROGRESS NOTE ADULT - SUBJECTIVE AND OBJECTIVE BOX
[facility]   MD Iron Jackson MD, D.O. Ruoru Wong, PA    From 7 AM - 5 PM:  OFFICE: [phone]  Dr. Ramsay cell: [phone]  Dr. Pichardo cell: [phone]  Dr. Prieto cell: [phone]  KATELYN Reese cell: [phone]    From 5 PM - 7 AM: Answering Service: [phone]  Date of service: 11-16-20 @ 11:05    RENAL FOLLOW UP NOTE  --------------------------------------------------------------------------------  HPI:  Pt seen and examined at bedside.        PAST HISTORY  --------------------------------------------------------------------------------  No significant changes to PMH, PSH, FHx, SHx, unless otherwise noted    ALLERGIES & MEDICATIONS  --------------------------------------------------------------------------------  Allergies    shellfish (Unknown)  sulfa drugs (Unknown)    Intolerances      Standing Inpatient Medications  aMIOdarone    Tablet 200 milliGRAM(s) Oral daily  colchicine 0.6 milliGRAM(s) Oral daily  dextrose 5% + sodium chloride 0.45% with potassium chloride 20 mEq/L 1000 milliLiter(s) IV Continuous <Continuous>  enoxaparin Injectable 40 milliGRAM(s) SubCutaneous daily  furosemide    Tablet 20 milliGRAM(s) Oral daily  metolazone 5 milliGRAM(s) Oral daily  predniSONE   Tablet 10 milliGRAM(s) Oral daily  tamsulosin 0.4 milliGRAM(s) Oral at bedtime    PRN Inpatient Medications      REVIEW OF SYSTEMS  --------------------------------------------------------------------------------  General: no fever  CVS: no chest pain  RESP: no sob, no cough  ABD: no abdominal pain  : no dysuria,  MSK: no edema     VITALS/PHYSICAL EXAM  --------------------------------------------------------------------------------  T(C): 36.4 (11-16-20 @ 09:30), Max: 36.8 (11-15-20 @ 20:49)  HR: 69 (11-16-20 @ 09:30) (69 - 83)  BP: 127/70 (11-16-20 @ 09:30) (123/69 - 136/78)  RR: 17 (11-16-20 @ 09:30) (16 - 18)  SpO2: 97% (11-16-20 @ 09:30) (97% - 99%)  Wt(kg): --    Weight (kg): 76.7 (11-14-20 @ 15:44)      11-15-20 @ 07:01  -  11-16-20 @ 07:00  --------------------------------------------------------  IN: 1360 mL / OUT: 1300 mL / NET: 60 mL      Physical Exam:  	Gen: NAD  	HEENT: MMM  	Pulm: CTA B/L  	CV: S1S2  	Abd: Soft, +BS  	Ext: No LE edema B/L                      Neuro: Awake   	Skin: Warm and Dry       LABS/STUDIES  --------------------------------------------------------------------------------              9.3    3.55  >-----------<  92       [11-16-20 @ 06:54]              28.6     136  |  97  |  45  ----------------------------<  152      [11-16-20 @ 06:53]  3.3   |  30  |  1.46        Ca     9.0     [11-16-20 @ 06:53]      Mg     2.0     [11-16-20 @ 06:53]      Phos  1.9     [11-16-20 @ 06:53]      PT/INR: PT 15.7 , INR 1.35       [11-15-20 @ 08:14]  PTT: 31.8       [11-15-20 @ 08:14]      Creatinine Trend:  SCr 1.46 [11-16 @ 06:53]  SCr 1.61 [11-15 @ 06:30]  SCr 1.36 [11-14 @ 06:30]  SCr 2.24 [11-13 @ 11:45]    Urinalysis - [11-13-20 @ 19:29]      Color Light Yellow / Appearance Clear / SG 1.015 / pH 6.0      Gluc Negative / Ketone Negative  / Bili Negative / Urobili Negative       Blood Negative / Protein Negative / Leuk Est Negative / Nitrite Negative      RBC 0 / WBC 0 / Hyaline 1 / Gran  / Sq Epi  / Non Sq Epi 0 / Bacteria Negative

## 2020-11-16 NOTE — PROGRESS NOTE ADULT - SUBJECTIVE AND OBJECTIVE BOX
Patient is a 89y old  Male who presents with a chief complaint of s/p fall (15 Nov 2020 12:12)      SUBJECTIVE / OVERNIGHT EVENTS:    Patient seen and examined. denies cp sob. denies neck pain ha dizziness. eager to get out of hospital.      Vital Signs Last 24 Hrs  T(C): 36.4 (16 Nov 2020 09:30), Max: 36.8 (15 Nov 2020 20:49)  T(F): 97.6 (16 Nov 2020 09:30), Max: 98.2 (15 Nov 2020 20:49)  HR: 75 (16 Nov 2020 11:41) (69 - 83)  BP: 112/66 (16 Nov 2020 11:41) (112/66 - 136/78)  BP(mean): --  RR: 17 (16 Nov 2020 11:41) (16 - 18)  SpO2: 98% (16 Nov 2020 11:41) (97% - 99%)  I&O's Summary    15 Nov 2020 07:01  -  16 Nov 2020 07:00  --------------------------------------------------------  IN: 1360 mL / OUT: 1300 mL / NET: 60 mL        PE:  GENERAL: NAD, Comfortable  HEAD:  stitches right eyebrow and nasal bridge, ecchymosis nose and eyes  NECK: c collar, ecchymosis  CHEST/LUNG: Clear to auscultation bilaterally; No wheeze  HEART: Regular rate and rhythm; No murmurs, rubs, or gallops  ABDOMEN: Soft, Nontender, Nondistended; Bowel sounds present  Neuro: AAOx3, no focal deficit, 5/5 b/l extremities  EXTREMITIES:  2+ Peripheral Pulses, trace le edema  SKIN: No rashes or lesions    LABS:                        9.3    3.55  )-----------( 92       ( 16 Nov 2020 06:54 )             28.6     11-16    136  |  97  |  45<H>  ----------------------------<  152<H>  3.3<L>   |  30  |  1.46<H>    Ca    9.0      16 Nov 2020 06:53  Phos  1.9     11-16  Mg     2.0     11-16      PT/INR - ( 15 Nov 2020 08:14 )   PT: 15.7 sec;   INR: 1.35 ratio         PTT - ( 15 Nov 2020 08:14 )  PTT:31.8 sec  CAPILLARY BLOOD GLUCOSE                RADIOLOGY & ADDITIONAL TESTS:    Imaging Personally Reviewed:  [x] YES  [ ] NO    Consultant(s) Notes Reviewed:  [x] YES  [ ] NO    MEDICATIONS  (STANDING):  aMIOdarone    Tablet 200 milliGRAM(s) Oral daily  colchicine 0.6 milliGRAM(s) Oral daily  dextrose 5% + sodium chloride 0.45% with potassium chloride 20 mEq/L 1000 milliLiter(s) (115 mL/Hr) IV Continuous <Continuous>  enoxaparin Injectable 40 milliGRAM(s) SubCutaneous daily  furosemide    Tablet 20 milliGRAM(s) Oral daily  metolazone 5 milliGRAM(s) Oral daily  predniSONE   Tablet 10 milliGRAM(s) Oral daily  tamsulosin 0.4 milliGRAM(s) Oral at bedtime    MEDICATIONS  (PRN):      Care Discussed with Consultants/Other Providers [x] YES  [ ] NO    HEALTH ISSUES - PROBLEM Dx:  Atrial fibrillation, unspecified type  Atrial fibrillation, unspecified type    Syncope, unspecified syncope type  Syncope, unspecified syncope type

## 2020-11-16 NOTE — PROGRESS NOTE ADULT - ASSESSMENT
89M PMH AFib on xarelto, HAYLEE presenting s/p unwitnessed mechanical fall found to have nasal bone fracture, cervical spine fractures, and right SCM hematoma. medicine consult for co management.    # AFib  # HAYLEE  # unwitnessed mechanical fall  # nasal bone fx  # C5-7 fx  # NURYS on CKD 3  # chronic thrombocytopenia  # anemia    appreciate trauma recs  ro syncope, pt denies LOC but unwitnessed  TTE, check orthostatics  PT OOB  cardio following  holding AC, monitor h/h  ICD interrogation  appreciate NSG recs, pending MRI cervical  hypokalemia hypomag supplement  plastics no acute intervention  creat baseline 1.4 from [facility] EMR labs 10/2020  baseline platelets 115 from 10/2020 labs    PCP Dr. Steven Goldberg    [facility] with questions [phone].

## 2020-11-16 NOTE — PROGRESS NOTE ADULT - ASSESSMENT
88 y/o m with pmhx afib on Xarelto, HAYLEE, BIBEMS s/p unwitnessed fall. Pt states that he slipped and fall on his wet hard wood floor.     NURYS on CKD vs. progressive CKD   Labs reviewed on [facility], last scr 1.6 in 10/2019    Pt admitted with elevated Scr to 2.2   Pt non-oliguric.   NURYS likely pre-renal.   SCr improving. continue IVF  CT scan without hydronephrosis   Monitor urine output for hematuria   UA without protein or blood. No further w/u needed   Avoid nephrotoxins     Hypokalemia   serum K low   replete PO as need for k < 3.5     HTN  BP controlled    Anemia  Hb stable

## 2020-11-16 NOTE — PROGRESS NOTE ADULT - SUBJECTIVE AND OBJECTIVE BOX
Patient seen and examined at bedside.    --Anticoagulation--    Exam:  Intact    Vital Signs Last 24 Hrs  T(C): 36.5 (16 Nov 2020 04:27), Max: 36.8 (15 Nov 2020 20:49)  T(F): 97.7 (16 Nov 2020 04:27), Max: 98.2 (15 Nov 2020 20:49)  HR: 83 (16 Nov 2020 06:06) (69 - 83)  BP: 127/68 (16 Nov 2020 04:27) (123/69 - 136/78)  BP(mean): --  RR: 18 (16 Nov 2020 04:27) (16 - 18)  SpO2: 98% (16 Nov 2020 06:06) (97% - 99%)

## 2020-11-16 NOTE — PROGRESS NOTE ADULT - ASSESSMENT
89M w/ PMHx of aFib on Xarelto, HAYLEE, presenting to [facility] after suffering a unwitnessed mechanical fall.

## 2020-11-16 NOTE — PROGRESS NOTE ADULT - SUBJECTIVE AND OBJECTIVE BOX
[facility] Cardiology Progress Note  _______________________________    Pt. seen and examined. sleeping.    T(C): 36.5 (11-16-20 @ 04:27), Max: 36.8 (11-15-20 @ 20:49)  HR: 83 (11-16-20 @ 06:06) (69 - 83)  BP: 127/68 (11-16-20 @ 04:27) (123/69 - 136/78)  RR: 18 (11-16-20 @ 04:27) (16 - 18)  SpO2: 98% (11-16-20 @ 06:06) (97% - 99%)  I&O's Summary    15 Nov 2020 07:01  -  16 Nov 2020 07:00  --------------------------------------------------------  IN: 1360 mL / OUT: 1300 mL / NET: 60 mL        PHYSICAL EXAM:  GENERAL: NAD.  NECK: Supple  CHEST/LUNG: Clear to anterior auscultation bilaterally; No wheezes, rales, or rhonchi.  HEART: S1 S2 normal, RRR; No murmurs, rubs, or gallops  ABDOMEN: Soft, Nondistended      LABS:                        9.3    3.55  )-----------( 92       ( 16 Nov 2020 06:54 )             28.6     11-16    136  |  97  |  45<H>  ----------------------------<  152<H>  3.3<L>   |  30  |  1.46<H>    Ca    9.0      16 Nov 2020 06:53  Phos  1.9     11-16  Mg     2.0     11-16      PT/INR - ( 15 Nov 2020 08:14 )   PT: 15.7 sec;   INR: 1.35 ratio         PTT - ( 15 Nov 2020 08:14 )  PTT:31.8 sec              MEDICATIONS  (STANDING):  aMIOdarone    Tablet 200 milliGRAM(s) Oral daily  colchicine 0.6 milliGRAM(s) Oral daily  dextrose 5% + sodium chloride 0.45% with potassium chloride 20 mEq/L 1000 milliLiter(s) (115 mL/Hr) IV Continuous <Continuous>  enoxaparin Injectable 40 milliGRAM(s) SubCutaneous daily  furosemide    Tablet 20 milliGRAM(s) Oral daily  metolazone 5 milliGRAM(s) Oral daily  predniSONE   Tablet 10 milliGRAM(s) Oral daily  tamsulosin 0.4 milliGRAM(s) Oral at bedtime    MEDICATIONS  (PRN):        RADIOLOGY & ADDITIONAL TESTS:

## 2020-11-16 NOTE — PROGRESS NOTE ADULT - PROBLEM SELECTOR PLAN 1
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 multi level degen and foraminal stenosis. HCT negative, T/L spine negative for fx.   -trauma/neuro surg f/u   -MRI pending  -AC on hold in setting of traumatic fall , drop in h/h noted  - management per trauma surg

## 2020-11-16 NOTE — PROGRESS NOTE ADULT - ASSESSMENT
89M w/ PMHx of aFib on Swetha, HAYLEE, presenting to [facility] after suffering a unwitnessed mechanical fall found to have multiple facial fxs and cervical fxs. Currently has no headache, neck pain or radiculopathy/ numbness/ weakness.   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 multi level degen and foraminal stenosis. HCT negative, T/L spine negative for fx. Has AS. Exam intact.   ADM trauma  - no acute neurosurgical intervention  - Fitted C collar at all times  -MRI c spine wo pending

## 2020-11-16 NOTE — PROGRESS NOTE ADULT - SUBJECTIVE AND OBJECTIVE BOX
Patient evaluated to adjust [place] cervical spinal orthosis fitted yesterday   in the ER as requested by Neurosurgery adjustment done today  done by [facility] [phone] / [phone]

## 2020-11-16 NOTE — PROGRESS NOTE ADULT - PROBLEM SELECTOR PLAN 2
-stable  -AC on hold in setting of traumatic fall , drop in h/h noted     dvt ppx    Luis Kim D.O.  [phone]

## 2020-11-16 NOTE — PROGRESS NOTE ADULT - SUBJECTIVE AND OBJECTIVE BOX
SURGERY DAILY PROGRESS NOTE:     SUBJECTIVE/ROS: Patient seen and examined on morning rounds. No acute events overnight. Pain moderately controlled.      MEDICATIONS  (STANDING):  aMIOdarone    Tablet 200 milliGRAM(s) Oral daily  colchicine 0.6 milliGRAM(s) Oral daily  dextrose 5% + sodium chloride 0.45% with potassium chloride 20 mEq/L 1000 milliLiter(s) (115 mL/Hr) IV Continuous <Continuous>  enoxaparin Injectable 40 milliGRAM(s) SubCutaneous daily  furosemide    Tablet 20 milliGRAM(s) Oral daily  metolazone 5 milliGRAM(s) Oral daily  predniSONE   Tablet 10 milliGRAM(s) Oral daily  tamsulosin 0.4 milliGRAM(s) Oral at bedtime    MEDICATIONS  (PRN):      OBJECTIVE:    Vital Signs Last 24 Hrs  T(C): 36.5 (16 Nov 2020 04:27), Max: 36.8 (15 Nov 2020 20:49)  T(F): 97.7 (16 Nov 2020 04:27), Max: 98.2 (15 Nov 2020 20:49)  HR: 83 (16 Nov 2020 06:06) (69 - 83)  BP: 127/68 (16 Nov 2020 04:27) (123/69 - 136/78)  BP(mean): --  RR: 18 (16 Nov 2020 04:27) (16 - 18)  SpO2: 98% (16 Nov 2020 06:06) (97% - 99%)        I&O's Detail    15 Nov 2020 07:01  -  16 Nov 2020 07:00  --------------------------------------------------------  IN:    Lactated Ringers: 1000 mL    Oral Fluid: 360 mL  Total IN: 1360 mL    OUT:    Voided (mL): 1300 mL  Total OUT: 1300 mL    Total NET: 60 mL          Daily     Daily     LABS:                        9.3    3.55  )-----------( 92       ( 16 Nov 2020 06:54 )             28.6     11-16    136  |  97  |  45<H>  ----------------------------<  152<H>  3.3<L>   |  30  |  1.46<H>    Ca    9.0      16 Nov 2020 06:53  Phos  1.9     11-16  Mg     2.0     11-16      PT/INR - ( 15 Nov 2020 08:14 )   PT: 15.7 sec;   INR: 1.35 ratio         PTT - ( 15 Nov 2020 08:14 )  PTT:31.8 sec              PHYSICAL EXAM:    General Appearance: Resting comfortably, no acute distress  HEENT: Eye laceration repaired with sutures intact. Neck immobilized with C-collar  Chest: non-labored breathing, no respiratory distress  Abdomen: Soft, non-tender, non-distended, no peritonitis

## 2020-11-17 LAB
ANION GAP SERPL CALC-SCNC: 11 MMOL/L — SIGNIFICANT CHANGE UP (ref 5–17)
BUN SERPL-MCNC: 43 MG/DL — HIGH (ref 7–23)
CALCIUM SERPL-MCNC: 8.9 MG/DL — SIGNIFICANT CHANGE UP (ref 8.4–10.5)
CHLORIDE SERPL-SCNC: 99 MMOL/L — SIGNIFICANT CHANGE UP (ref 96–108)
CO2 SERPL-SCNC: 28 MMOL/L — SIGNIFICANT CHANGE UP (ref 22–31)
CREAT SERPL-MCNC: 1.6 MG/DL — HIGH (ref 0.5–1.3)
GLUCOSE SERPL-MCNC: 108 MG/DL — HIGH (ref 70–99)
HCT VFR BLD CALC: 29.1 % — LOW (ref 39–50)
HGB BLD-MCNC: 9.5 G/DL — LOW (ref 13–17)
MAGNESIUM SERPL-MCNC: 1.8 MG/DL — SIGNIFICANT CHANGE UP (ref 1.6–2.6)
MCHC RBC-ENTMCNC: 31.1 PG — SIGNIFICANT CHANGE UP (ref 27–34)
MCHC RBC-ENTMCNC: 32.6 GM/DL — SIGNIFICANT CHANGE UP (ref 32–36)
MCV RBC AUTO: 95.4 FL — SIGNIFICANT CHANGE UP (ref 80–100)
NRBC # BLD: 0 /100 WBCS — SIGNIFICANT CHANGE UP (ref 0–0)
PHOSPHATE SERPL-MCNC: 3.3 MG/DL — SIGNIFICANT CHANGE UP (ref 2.5–4.5)
PLATELET # BLD AUTO: 85 K/UL — LOW (ref 150–400)
POTASSIUM SERPL-MCNC: 3.5 MMOL/L — SIGNIFICANT CHANGE UP (ref 3.5–5.3)
POTASSIUM SERPL-SCNC: 3.5 MMOL/L — SIGNIFICANT CHANGE UP (ref 3.5–5.3)
RBC # BLD: 3.05 M/UL — LOW (ref 4.2–5.8)
RBC # FLD: 15.8 % — HIGH (ref 10.3–14.5)
SODIUM SERPL-SCNC: 138 MMOL/L — SIGNIFICANT CHANGE UP (ref 135–145)
WBC # BLD: 3.82 K/UL — SIGNIFICANT CHANGE UP (ref 3.8–10.5)
WBC # FLD AUTO: 3.82 K/UL — SIGNIFICANT CHANGE UP (ref 3.8–10.5)

## 2020-11-17 PROCEDURE — 72141 MRI NECK SPINE W/O DYE: CPT | Mod: 26

## 2020-11-17 PROCEDURE — 99232 SBSQ HOSP IP/OBS MODERATE 35: CPT

## 2020-11-17 PROCEDURE — 93306 TTE W/DOPPLER COMPLETE: CPT | Mod: 26

## 2020-11-17 PROCEDURE — 93286 PERI-PX EVAL PM/LDLS PM IP: CPT | Mod: 26

## 2020-11-17 RX ORDER — MAGNESIUM SULFATE 500 MG/ML
2 VIAL (ML) INJECTION ONCE
Refills: 0 | Status: COMPLETED | OUTPATIENT
Start: 2020-11-17 | End: 2020-11-17

## 2020-11-17 RX ORDER — POTASSIUM CHLORIDE 20 MEQ
40 PACKET (EA) ORAL ONCE
Refills: 0 | Status: COMPLETED | OUTPATIENT
Start: 2020-11-17 | End: 2020-11-17

## 2020-11-17 RX ORDER — ASPIRIN/CALCIUM CARB/MAGNESIUM 324 MG
81 TABLET ORAL DAILY
Refills: 0 | Status: DISCONTINUED | OUTPATIENT
Start: 2020-11-17 | End: 2020-11-19

## 2020-11-17 RX ADMIN — AMIODARONE HYDROCHLORIDE 200 MILLIGRAM(S): 400 TABLET ORAL at 05:50

## 2020-11-17 RX ADMIN — Medication 20 MILLIGRAM(S): at 05:50

## 2020-11-17 RX ADMIN — Medication 40 MILLIEQUIVALENT(S): at 12:42

## 2020-11-17 RX ADMIN — ENOXAPARIN SODIUM 40 MILLIGRAM(S): 100 INJECTION SUBCUTANEOUS at 12:25

## 2020-11-17 RX ADMIN — Medication 50 GRAM(S): at 16:41

## 2020-11-17 RX ADMIN — Medication 10 MILLIGRAM(S): at 05:50

## 2020-11-17 RX ADMIN — Medication 0.6 MILLIGRAM(S): at 12:24

## 2020-11-17 RX ADMIN — TAMSULOSIN HYDROCHLORIDE 0.4 MILLIGRAM(S): 0.4 CAPSULE ORAL at 21:18

## 2020-11-17 NOTE — PROGRESS NOTE ADULT - SUBJECTIVE AND OBJECTIVE BOX
[facility] NEPHROLOGY [facility]ACTICE   MD Iron Jackson MD, D.O. Ruoru Wong, PA    From 7 AM - 5 PM:  OFFICE: [phone]  Dr. Ramsay cell: [phone]  Dr. Pichardo cell: [phone]  Dr. Prieto cell: [phone]  KATELYN Reese cell: [phone]    From 5 PM - 7 AM: Answering Service: [phone]  Date of service: 11-17-20 @ 11:39    RENAL FOLLOW UP NOTE  --------------------------------------------------------------------------------  HPI:  Pt seen and examined at bedside.   Joseies SOB, chest pain     PAST HISTORY  --------------------------------------------------------------------------------  No significant changes to PMH, PSH, FHx, SHx, unless otherwise noted    ALLERGIES & MEDICATIONS  --------------------------------------------------------------------------------  Allergies    shellfish (Unknown)  sulfa drugs (Unknown)    Intolerances      Standing Inpatient Medications  aMIOdarone    Tablet 200 milliGRAM(s) Oral daily  colchicine 0.6 milliGRAM(s) Oral daily  enoxaparin Injectable 40 milliGRAM(s) SubCutaneous daily  furosemide    Tablet 20 milliGRAM(s) Oral daily  metolazone 5 milliGRAM(s) Oral daily  predniSONE   Tablet 10 milliGRAM(s) Oral daily  tamsulosin 0.4 milliGRAM(s) Oral at bedtime    PRN Inpatient Medications      REVIEW OF SYSTEMS  --------------------------------------------------------------------------------  General: no fever  CVS: no chest pain  RESP: no sob, no cough  ABD: no abdominal pain  : no dysuria,  MSK: no edema     VITALS/PHYSICAL EXAM  --------------------------------------------------------------------------------  T(C): 36.6 (11-17-20 @ 09:50), Max: 36.8 (11-16-20 @ 21:22)  HR: 70 (11-17-20 @ 09:50) (60 - 75)  BP: 106/63 (11-17-20 @ 09:50) (106/63 - 158/76)  RR: 18 (11-17-20 @ 09:50) (17 - 18)  SpO2: 96% (11-17-20 @ 09:50) (96% - 100%)  Wt(kg): --        11-16-20 @ 07:01  -  11-17-20 @ 07:00  --------------------------------------------------------  IN: 240 mL / OUT: 300 mL / NET: -60 mL    11-17-20 @ 07:01  -  11-17-20 @ 11:39  --------------------------------------------------------  IN: 240 mL / OUT: 100 mL / NET: 140 mL      Physical Exam:  	Gen: NAD  	HEENT: MMM neck collar   	Pulm: CTA B/L  	CV: S1S2  	Abd: Soft, +BS  	Ext: No LE edema B/L                      Neuro: Awake   	Skin: Warm and Dry   	    LABS/STUDIES  --------------------------------------------------------------------------------              9.5    3.82  >-----------<  85       [11-17-20 @ 06:32]              29.1     138  |  99  |  43  ----------------------------<  108      [11-17-20 @ 06:32]  3.5   |  28  |  1.60        Ca     8.9     [11-17-20 @ 06:32]      Mg     1.8     [11-17-20 @ 06:32]      Phos  3.3     [11-17-20 @ 06:32]      Creatinine Trend:  SCr 1.60 [11-17 @ 06:32]  SCr 1.46 [11-16 @ 06:53]  SCr 1.61 [11-15 @ 06:30]  SCr 1.36 [11-14 @ 06:30]  SCr 2.24 [11-13 @ 11:45]    Urinalysis - [11-13-20 @ 19:29]      Color Light Yellow / Appearance Clear / SG 1.015 / pH 6.0      Gluc Negative / Ketone Negative  / Bili Negative / Urobili Negative       Blood Negative / Protein Negative / Leuk Est Negative / Nitrite Negative      RBC 0 / WBC 0 / Hyaline 1 / Gran  / Sq Epi  / Non Sq Epi 0 / Bacteria Negative

## 2020-11-17 NOTE — PROGRESS NOTE ADULT - PROBLEM SELECTOR PLAN 1
-c/o of dizziness leading up to fall   -no cp/sob  -EKG with no acute ischemia, V paced   -EP for PPM interrogation- unremarkable.   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 multi level degen and foraminal stenosis. HCT negative, T/L spine negative for fx.   -trauma/neuro surg f/u   -AC on hold in setting of traumatic fall , drop in h/h noted  - management per trauma surg

## 2020-11-17 NOTE — PROGRESS NOTE ADULT - SUBJECTIVE AND OBJECTIVE BOX
[facility] Cardiology Progress Note  _______________________________    Pt. seen and examined. No new cardiac-related complaints.    T(C): 36.3 (11-17-20 @ 05:47), Max: 36.8 (11-16-20 @ 21:22)  HR: 69 (11-17-20 @ 05:47) (60 - 75)  BP: 128/81 (11-17-20 @ 05:47) (112/66 - 158/76)  RR: 18 (11-17-20 @ 05:47) (17 - 18)  SpO2: 96% (11-17-20 @ 05:47) (96% - 100%)  I&O's Summary    16 Nov 2020 07:01  -  17 Nov 2020 07:00  --------------------------------------------------------  IN: 240 mL / OUT: 300 mL / NET: -60 mL    17 Nov 2020 07:01  -  17 Nov 2020 09:36  --------------------------------------------------------  IN: 0 mL / OUT: 100 mL / NET: -100 mL        PHYSICAL EXAM:  GENERAL: Alert, NAD.  NECK: Supple  CHEST/LUNG: Clear to auscultation bilaterally; No wheezes, rales, or rhonchi.  HEART: S1 S2 normal, RRR; No murmurs, rubs, or gallops  ABDOMEN: Soft, Nondistended  EXTREMITIES:  No LE edema.      LABS:                        9.5    3.82  )-----------( 85       ( 17 Nov 2020 06:32 )             29.1     11-17    138  |  99  |  43<H>  ----------------------------<  108<H>  3.5   |  28  |  1.60<H>    Ca    8.9      17 Nov 2020 06:32  Phos  3.3     11-17  Mg     1.8     11-17                    MEDICATIONS  (STANDING):  aMIOdarone    Tablet 200 milliGRAM(s) Oral daily  colchicine 0.6 milliGRAM(s) Oral daily  enoxaparin Injectable 40 milliGRAM(s) SubCutaneous daily  furosemide    Tablet 20 milliGRAM(s) Oral daily  metolazone 5 milliGRAM(s) Oral daily  predniSONE   Tablet 10 milliGRAM(s) Oral daily  tamsulosin 0.4 milliGRAM(s) Oral at bedtime    MEDICATIONS  (PRN):        RADIOLOGY & ADDITIONAL TESTS:

## 2020-11-17 NOTE — PROGRESS NOTE ADULT - SUBJECTIVE AND OBJECTIVE BOX
Patient is a 89y old  Male who presents with a chief complaint of s/p fall (15 Nov 2020 12:12)      SUBJECTIVE / OVERNIGHT EVENTS:    Patient seen and examined. denies cp sob. some pain when taking deep breath.      Vital Signs Last 24 Hrs  T(C): 36.6 (17 Nov 2020 09:50), Max: 36.8 (16 Nov 2020 21:22)  T(F): 97.9 (17 Nov 2020 09:50), Max: 98.2 (16 Nov 2020 21:22)  HR: 70 (17 Nov 2020 09:50) (60 - 74)  BP: 106/63 (17 Nov 2020 09:50) (106/63 - 158/76)  BP(mean): --  RR: 18 (17 Nov 2020 09:50) (17 - 18)  SpO2: 96% (17 Nov 2020 09:50) (96% - 100%)  I&O's Summary    16 Nov 2020 07:01  -  17 Nov 2020 07:00  --------------------------------------------------------  IN: 240 mL / OUT: 300 mL / NET: -60 mL    17 Nov 2020 07:01  -  17 Nov 2020 13:42  --------------------------------------------------------  IN: 240 mL / OUT: 100 mL / NET: 140 mL        PE:  GENERAL: NAD, Comfortable  HEAD:  stitches right eyebrow and nasal bridge, ecchymosis nose and eyes  NECK: c collar, ecchymosis  CHEST/LUNG: Clear to auscultation bilaterally; No wheeze  HEART: Regular rate and rhythm; No murmurs, rubs, or gallops  ABDOMEN: Soft, Nontender, Nondistended; Bowel sounds present  Neuro: AAOx3, no focal deficit, 5/5 b/l extremities  EXTREMITIES:  2+ Peripheral Pulses, trace le edema  SKIN: No rashes or lesions    LABS:                        9.5    3.82  )-----------( 85       ( 17 Nov 2020 06:32 )             29.1     11-17    138  |  99  |  43<H>  ----------------------------<  108<H>  3.5   |  28  |  1.60<H>    Ca    8.9      17 Nov 2020 06:32  Phos  3.3     11-17  Mg     1.8     11-17        CAPILLARY BLOOD GLUCOSE                RADIOLOGY & ADDITIONAL TESTS:    Imaging Personally Reviewed:  [x] YES  [ ] NO    Consultant(s) Notes Reviewed:  [x] YES  [ ] NO    MEDICATIONS  (STANDING):  aMIOdarone    Tablet 200 milliGRAM(s) Oral daily  colchicine 0.6 milliGRAM(s) Oral daily  enoxaparin Injectable 40 milliGRAM(s) SubCutaneous daily  furosemide    Tablet 20 milliGRAM(s) Oral daily  magnesium sulfate  IVPB 2 Gram(s) IV Intermittent once  metolazone 5 milliGRAM(s) Oral daily  predniSONE   Tablet 10 milliGRAM(s) Oral daily  tamsulosin 0.4 milliGRAM(s) Oral at bedtime    MEDICATIONS  (PRN):      Care Discussed with Consultants/Other Providers [x] YES  [ ] NO    HEALTH ISSUES - PROBLEM Dx:  Atrial fibrillation, unspecified type  Atrial fibrillation, unspecified type    Syncope, unspecified syncope type  Syncope, unspecified syncope type

## 2020-11-17 NOTE — PROGRESS NOTE ADULT - ASSESSMENT
89M w/ PMHx of aFib on Xarelto, HAYLEE, presenting to [facility] after suffering a unwitnessed mechanical fall.

## 2020-11-17 NOTE — PROGRESS NOTE ADULT - ASSESSMENT
89M presenting s/p mechanical fall found to have nasal bone fracture cervical spine fractures, and right SCM hematoma. Patient is with C collar, neck immobilized, facial lacerations sutured, and hemodynamically stable.    PLAN:    - diet as tolerated  - Pain control   - DVT PPx: Lovenox  - Appreciate Neuro recs: C- collar all time.  - MRI C-spine today, Patient's pacemaker compatible with MRI  - Appreciate Plastic's recs: No immediate intervention for nasal bone fracture. Patient can f/u as outpatient one week after discharge.  - Isaiah improving, downtrending, renal following  - echocadiogram ordered        Acute Care Surgery   [id number]

## 2020-11-17 NOTE — PROGRESS NOTE ADULT - PROBLEM SELECTOR PLAN 2
-stable  -AC on hold in setting of traumatic fall , drop in h/h noted     dvt ppx    Luis Kim D.O.  [phone]

## 2020-11-17 NOTE — PROGRESS NOTE ADULT - ASSESSMENT
89M w/ PMHx of aFib on Swetha, HAYLEE, presenting to [facility] after suffering a unwitnessed mechanical fall found to have multiple facial fxs and cervical fxs. Currently has no headache, neck pain or radiculopathy/ numbness/ weakness.   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 multi level degen and foraminal stenosis. HCT negative, T/L spine negative for fx. Has AS. Exam intact.   ADM trauma  - no acute neurosurgical intervention  - Fitted C collar at all times  -MRI c spine wo pending

## 2020-11-17 NOTE — PROGRESS NOTE ADULT - SUBJECTIVE AND OBJECTIVE BOX
SURGERY DAILY PROGRESS NOTE:       SUBJECTIVE/ROS: Patient seen and examined on morning rounds. No acute events overnight. Pain controlled. Cervical collar re-fitted and comfortable.     MEDICATIONS  (STANDING):  aMIOdarone    Tablet 200 milliGRAM(s) Oral daily  colchicine 0.6 milliGRAM(s) Oral daily  enoxaparin Injectable 40 milliGRAM(s) SubCutaneous daily  furosemide    Tablet 20 milliGRAM(s) Oral daily  metolazone 5 milliGRAM(s) Oral daily  predniSONE   Tablet 10 milliGRAM(s) Oral daily  tamsulosin 0.4 milliGRAM(s) Oral at bedtime    MEDICATIONS  (PRN):      OBJECTIVE:    Vital Signs Last 24 Hrs  T(C): 36.3 (17 Nov 2020 05:47), Max: 36.8 (16 Nov 2020 21:22)  T(F): 97.3 (17 Nov 2020 05:47), Max: 98.2 (16 Nov 2020 21:22)  HR: 69 (17 Nov 2020 05:47) (60 - 75)  BP: 128/81 (17 Nov 2020 05:47) (112/66 - 158/76)  BP(mean): --  RR: 18 (17 Nov 2020 05:47) (17 - 18)  SpO2: 96% (17 Nov 2020 05:47) (96% - 100%)        I&O's Detail    16 Nov 2020 07:01  -  17 Nov 2020 07:00  --------------------------------------------------------  IN:    Oral Fluid: 240 mL  Total IN: 240 mL    OUT:    Voided (mL): 300 mL  Total OUT: 300 mL    Total NET: -60 mL      17 Nov 2020 07:01  -  17 Nov 2020 07:42  --------------------------------------------------------  IN:  Total IN: 0 mL    OUT:    Voided (mL): 100 mL  Total OUT: 100 mL    Total NET: -100 mL          Daily     Daily     LABS:                        9.5    3.82  )-----------( 85       ( 17 Nov 2020 06:32 )             29.1     11-17    138  |  99  |  43<H>  ----------------------------<  108<H>  3.5   |  28  |  1.60<H>    Ca    8.9      17 Nov 2020 06:32  Phos  3.3     11-17  Mg     1.8     11-17      PT/INR - ( 15 Nov 2020 08:14 )   PT: 15.7 sec;   INR: 1.35 ratio         PTT - ( 15 Nov 2020 08:14 )  PTT:31.8 sec              PHYSICAL EXAM:    General Appearance: Resting comfortably, no acute distress  HEENT: Eye laceration repaired with sutures intact. Neck immobilized with C-collar  Chest: non-labored breathing, no respiratory distress  Abdomen: Soft, non-tender, non-distended, no peritonitis

## 2020-11-17 NOTE — PROGRESS NOTE ADULT - ASSESSMENT
89 y/o m with pmhx afib on Xarelto, HAYLEE, BIBEMS s/p unwitnessed fall. Pt states that he slipped and fall on his wet hard wood floor.     NURYS on CKD vs. progressive CKD   Labs reviewed on [facility], last scr 1.6 in 10/2019    Pt admitted with elevated Scr to 2.2   Pt non-oliguric.   NURYS likely pre-renal.   SCr has stablized at pt's baseline  No need for further IVF   CT scan without hydronephrosis   Monitor urine output for hematuria   UA without protein or blood. No further w/u needed   Avoid nephrotoxins     Hypokalemia   serum K low   replete PO as need for k < 3.5     HTN  BP controlled    Anemia  Hb stable

## 2020-11-17 NOTE — PROGRESS NOTE ADULT - ASSESSMENT
89M PMH AFib on xarelto, HAYLEE presenting s/p unwitnessed mechanical fall found to have nasal bone fracture, cervical spine fractures, and right SCM hematoma. medicine consult for co management.    # AFib  # HAYLEE  # unwitnessed mechanical fall  # nasal bone fx  # C5-7 fx  # NURYS on CKD 3  # chronic thrombocytopenia  # anemia    appreciate trauma recs  ro syncope, pt denies LOC but unwitnessed  TTE, check orthostatics  PT OOB  cardio following  holding AC, monitor h/h  ICD interrogation no acute events  appreciate NSG recs, pending MRI cervical  plastics no acute intervention  creat baseline 1.4 from  EMR labs 10/2020  baseline platelets 115 from 10/2020 labs  incentive spirometry ordered, no signs of pna, likely atelectasis    PCP Dr. Steven Goldberg    Please call [facility] with questions [phone].

## 2020-11-18 ENCOUNTER — TRANSCRIPTION ENCOUNTER (OUTPATIENT)
Age: 85
End: 2020-11-18

## 2020-11-18 LAB
ANION GAP SERPL CALC-SCNC: 10 MMOL/L — SIGNIFICANT CHANGE UP (ref 5–17)
BUN SERPL-MCNC: 43 MG/DL — HIGH (ref 7–23)
CALCIUM SERPL-MCNC: 9.1 MG/DL — SIGNIFICANT CHANGE UP (ref 8.4–10.5)
CHLORIDE SERPL-SCNC: 101 MMOL/L — SIGNIFICANT CHANGE UP (ref 96–108)
CO2 SERPL-SCNC: 29 MMOL/L — SIGNIFICANT CHANGE UP (ref 22–31)
CREAT SERPL-MCNC: 1.74 MG/DL — HIGH (ref 0.5–1.3)
GLUCOSE SERPL-MCNC: 121 MG/DL — HIGH (ref 70–99)
HCT VFR BLD CALC: 29.9 % — LOW (ref 39–50)
HGB BLD-MCNC: 9.5 G/DL — LOW (ref 13–17)
MAGNESIUM SERPL-MCNC: 2.1 MG/DL — SIGNIFICANT CHANGE UP (ref 1.6–2.6)
MCHC RBC-ENTMCNC: 31.1 PG — SIGNIFICANT CHANGE UP (ref 27–34)
MCHC RBC-ENTMCNC: 31.8 GM/DL — LOW (ref 32–36)
MCV RBC AUTO: 98 FL — SIGNIFICANT CHANGE UP (ref 80–100)
NRBC # BLD: 0 /100 WBCS — SIGNIFICANT CHANGE UP (ref 0–0)
PHOSPHATE SERPL-MCNC: 3.1 MG/DL — SIGNIFICANT CHANGE UP (ref 2.5–4.5)
PLATELET # BLD AUTO: 101 K/UL — LOW (ref 150–400)
POTASSIUM SERPL-MCNC: 3.4 MMOL/L — LOW (ref 3.5–5.3)
POTASSIUM SERPL-SCNC: 3.4 MMOL/L — LOW (ref 3.5–5.3)
RBC # BLD: 3.05 M/UL — LOW (ref 4.2–5.8)
RBC # FLD: 15.7 % — HIGH (ref 10.3–14.5)
SODIUM SERPL-SCNC: 140 MMOL/L — SIGNIFICANT CHANGE UP (ref 135–145)
WBC # BLD: 3.67 K/UL — LOW (ref 3.8–10.5)
WBC # FLD AUTO: 3.67 K/UL — LOW (ref 3.8–10.5)

## 2020-11-18 PROCEDURE — 93286 PERI-PX EVAL PM/LDLS PM IP: CPT | Mod: 26

## 2020-11-18 PROCEDURE — 99233 SBSQ HOSP IP/OBS HIGH 50: CPT

## 2020-11-18 PROCEDURE — 70544 MR ANGIOGRAPHY HEAD W/O DYE: CPT | Mod: 26,59

## 2020-11-18 PROCEDURE — 70553 MRI BRAIN STEM W/O & W/DYE: CPT | Mod: 26

## 2020-11-18 PROCEDURE — 70549 MR ANGIOGRAPH NECK W/O&W/DYE: CPT | Mod: 26

## 2020-11-18 RX ORDER — APIXABAN 2.5 MG/1
1 TABLET, FILM COATED ORAL
Qty: 0 | Refills: 0 | DISCHARGE

## 2020-11-18 RX ORDER — OXYCODONE HYDROCHLORIDE 5 MG/1
5 TABLET ORAL ONCE
Refills: 0 | Status: DISCONTINUED | OUTPATIENT
Start: 2020-11-18 | End: 2020-11-18

## 2020-11-18 RX ORDER — POTASSIUM CHLORIDE 20 MEQ
20 PACKET (EA) ORAL
Refills: 0 | Status: COMPLETED | OUTPATIENT
Start: 2020-11-18 | End: 2020-11-18

## 2020-11-18 RX ORDER — HYDROMORPHONE HYDROCHLORIDE 2 MG/ML
0.25 INJECTION INTRAMUSCULAR; INTRAVENOUS; SUBCUTANEOUS ONCE
Refills: 0 | Status: DISCONTINUED | OUTPATIENT
Start: 2020-11-18 | End: 2020-11-18

## 2020-11-18 RX ADMIN — Medication 20 MILLIGRAM(S): at 05:42

## 2020-11-18 RX ADMIN — Medication 20 MILLIEQUIVALENT(S): at 21:05

## 2020-11-18 RX ADMIN — HYDROMORPHONE HYDROCHLORIDE 0.25 MILLIGRAM(S): 2 INJECTION INTRAMUSCULAR; INTRAVENOUS; SUBCUTANEOUS at 14:52

## 2020-11-18 RX ADMIN — ENOXAPARIN SODIUM 40 MILLIGRAM(S): 100 INJECTION SUBCUTANEOUS at 18:05

## 2020-11-18 RX ADMIN — Medication 0.6 MILLIGRAM(S): at 18:04

## 2020-11-18 RX ADMIN — TAMSULOSIN HYDROCHLORIDE 0.4 MILLIGRAM(S): 0.4 CAPSULE ORAL at 21:06

## 2020-11-18 RX ADMIN — Medication 20 MILLIEQUIVALENT(S): at 18:05

## 2020-11-18 RX ADMIN — Medication 10 MILLIGRAM(S): at 05:42

## 2020-11-18 RX ADMIN — AMIODARONE HYDROCHLORIDE 200 MILLIGRAM(S): 400 TABLET ORAL at 05:42

## 2020-11-18 RX ADMIN — Medication 81 MILLIGRAM(S): at 18:06

## 2020-11-18 NOTE — PROGRESS NOTE ADULT - SUBJECTIVE AND OBJECTIVE BOX
SUBJECTIVE:   Patient seen and examined at bedside during AM rounds. No acute events overnight.     OBJECTIVE: T(C): 36.3 (11-18-20 @ 09:45), Max: 36.5 (11-18-20 @ 05:40)  HR: 71 (11-18-20 @ 09:45) (69 - 71)  BP: 108/61 (11-18-20 @ 09:45) (108/61 - 123/68)  RR: 17 (11-18-20 @ 09:45) (17 - 18)  SpO2: 98% (11-18-20 @ 09:45) (96% - 99%)  Wt(kg): --  I&O's Summary    17 Nov 2020 07:01  -  18 Nov 2020 07:00  --------------------------------------------------------  IN: 560 mL / OUT: 720 mL / NET: -160 mL    18 Nov 2020 07:01  -  18 Nov 2020 10:34  --------------------------------------------------------  IN: 240 mL / OUT: 0 mL / NET: 240 mL      I&O's Detail    17 Nov 2020 07:01  -  18 Nov 2020 07:00  --------------------------------------------------------  IN:    Oral Fluid: 560 mL  Total IN: 560 mL    OUT:    Voided (mL): 720 mL  Total OUT: 720 mL    Total NET: -160 mL      18 Nov 2020 07:01  -  18 Nov 2020 10:34  --------------------------------------------------------  IN:    Oral Fluid: 240 mL  Total IN: 240 mL    OUT:  Total OUT: 0 mL    Total NET: 240 mL        PHYSICAL EXAM:    General Appearance: Resting comfortably, no acute distress  HEENT: Eye laceration repaired with sutures intact.   Chest: non-labored breathing, no respiratory distress  Abdomen: Soft, non-tender, non-distended      MEDICATIONS  (STANDING):  aMIOdarone    Tablet 200 milliGRAM(s) Oral daily  aspirin  chewable 81 milliGRAM(s) Oral daily  colchicine 0.6 milliGRAM(s) Oral daily  enoxaparin Injectable 40 milliGRAM(s) SubCutaneous daily  furosemide    Tablet 20 milliGRAM(s) Oral daily  metolazone 5 milliGRAM(s) Oral daily  predniSONE   Tablet 10 milliGRAM(s) Oral daily  tamsulosin 0.4 milliGRAM(s) Oral at bedtime    MEDICATIONS  (PRN):      LABS:                        9.5    3.67  )-----------( 101      ( 18 Nov 2020 06:50 )             29.9     11-18    140  |  101  |  43<H>  ----------------------------<  121<H>  3.4<L>   |  29  |  1.74<H>    Ca    9.1      18 Nov 2020 06:50  Phos  3.1     11-18  Mg     2.1     11-18            RADIOLOGY & ADDITIONAL STUDIES:  < from: MR Cervical Spine No Cont (11.17.20 @ 14:26) >  FINDINGS:    ALIGNMENT: Straightening of the normal cervical lordosis.    OSSEOUS STRUCTURES AND SOFT TISSUES:  Again noted is fracture line extending from the fused anterior C5-C6 endplates to the fused posterior C6-C7 endplates. There is associated prevertebral soft tissue swelling extending from C3 to C7 levels. There is no epidural hematoma or bony retropulsion.    Mild soft tissue edema noted in the C6-C7 interspinous ligament region.    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    Redemonstration of hematoma within the right sternocleidomastoid muscle.    CORD: No abnormal cord signal.    IMAGED BRAIN: Unremarkable.  OTHER: Loss of flow void in the left vertebralartery throughout its visualized course in the neck.    DISC LEVEL EVALUATION:    C2/C3: No spinal stenosis. Right greater than left facet hypertrophy causes moderate right neural foraminal narrowing.  C3/C4: Bilateral facet hypertrophy, right greater than left, causes moderate right and mild left neural foraminal narrowing. No spinal canal stenosis.  C4/C5: Bilateral facet hypertrophy with at least moderate bilateral neural foraminal narrowing. No spinal canal stenosis.  C5/C6: Minimal disc osteophyte complex. No Spinal canal stenosis, or neuroforaminal stenosis.  C6/C7: Minimal disc osteophyte complex. No spinal canal stenosis or neuroforaminal stenosis. C7/T1: Disc bulge, dorsal osteophytosis, and facet hypertrophy indents the thecal sac. Moderate right and mild left neuroforaminal stenosis. No spinal canal stenosis.    IMPRESSION:  -Loss of left cervical vertebral artery flow void concerning for occlusion/dissection, of indeterminate age. Recommend CTA or MRA of the neck for further evaluation and MRI of the brain to assess for possible infarctions.    -Redemonstrated acute fracture extending from C5-C6 to C6-C7 endplates without bony retropulsion or epidural hematoma.    -Mild soft tissue edema involving the C6-C7 interspinous ligament, which could represent ligamentous injury.    -Right sternocleidomastoid hematomas again noted.    Left vertebral artery findings were discussed with KATELYN Christensen on 11/17/2020 at 3:06 PM by Dr. Ag with read back confirmation.                RAUL AG MD; Resident Radiology  This document has been electronically signed.  MULU RODRIGUES M.D., ATTENDING RADIOLOGIST  This document has been electronically signed. Nov 17 2020  3:28PM    < end of copied text >

## 2020-11-18 NOTE — DISCHARGE NOTE PROVIDER - NSDCMRMEDTOKEN_GEN_ALL_CORE_FT
amiodarone 200 mg oral tablet: 1 tab(s) orally once a day  [place] Cervical collar: 1 unit(s) compounding once a day   colchicine 0.6 mg oral tablet: 1 tab(s) orally once a day  gabapentin 100 mg oral tablet: 1 tab(s) orally once a day  Lasix 20 mg oral tablet: 1 tab(s) orally once a day  metOLazone 5 mg oral tablet: 1 tab(s) orally once a day  predniSONE 10 mg oral tablet: 1 tab(s) orally once a day  tamsulosin 0.4 mg oral capsule: 1 cap(s) orally once a day   amiodarone 200 mg oral tablet: 1 tab(s) orally once a day  [place] Cervical collar: 1 unit(s) compounding once a day   colchicine 0.6 mg oral tablet: 1 tab(s) orally once a day  Eliquis 2.5 mg oral tablet: 1 tab(s) orally once a day  gabapentin 100 mg oral tablet: 1 tab(s) orally once a day  Lasix 20 mg oral tablet: 1 tab(s) orally once a day  metOLazone 5 mg oral tablet: 1 tab(s) orally once a day  predniSONE 10 mg oral tablet: 1 tab(s) orally once a day  ROLLING WALKER : 1   tamsulosin 0.4 mg oral capsule: 1 cap(s) orally once a day

## 2020-11-18 NOTE — DISCHARGE NOTE PROVIDER - HOSPITAL COURSE
89M w/ PMHx of aFib on HAYLEE Posada, presented to [facility] on 11/13 after suffering a unwitnessed mechanical fall. Patient was walking at home when he felt dizzy and lightheaded and fell forward hitting his face. Unknown LOC. Patient taken to the ED for further evaluation. Wife expressed that patient had been feeling dizzy and lightheaded for the past day.  In the ED, patient was afebrile, hemodynamically stable, labs remarkable for significant electrolyte imbalances with a potassium of 2.6 and acute kidney injury with BUN of 78 and Creatinine of 2.24, CTH and neck revealed comminuted fractures of bilateral nasal bones and soft tissue swelling right sternomastoid muscle with low attenuation collection and fluid level, (6:102), prevertebral soft tissue swelling, fused C5-C7 vertebra, linear lucencies concerning for fractures of C5, C6, C7 vertebra, horizontal fracture through osseous fused anterior longitudinal ligament C5-C6, (605:28, 604:20), correlate for hyperextension injury.  Given mechanism of injury and findings of multiple injuries, patient would benefit from pain control and consultation to plastic Sx for facial fractures and Orthopeadic surgery for cervical fractures. Nephrology was consulted as well for NURYS and recs were all followed. As per neurosurgery no acute neurosurgical intervention and custom c collar was delivered and placed. As per plastic surgery no immediate intervention was needed and he was advised to follow up in one week with . Internal medicine recs were followed as well. Cardiology was consulted, the EKG with no acute ischemia, V paced. -EP was called for PPM interrogation which was unremarkable. AC was held in setting of traumatic fall.   MRI cervical spine was performed which showed Loss of left cervical vertebral artery flow void concerning for occlusion/dissection, of indeterminate age. MRA of the neck for further evaluation and MRI of the brain to assess for possible infarctions was performed on 11/18. Neuro exam remained stable.  Patient was seen by PT which recommended home with home pt.   On day of discharge, the patient was tolerating diet, ambulating well and pain controlled. 89M w/ PMHx of aFib on HAYLEE Posada, presented to [facility] on 11/13 after suffering a unwitnessed mechanical fall. Patient was walking at home when he felt dizzy and lightheaded and fell forward hitting his face. Unknown LOC. Patient taken to the ED for further evaluation. Wife expressed that patient had been feeling dizzy and lightheaded for the past day.  In the ED, patient was afebrile, hemodynamically stable, labs remarkable for significant electrolyte imbalances with a potassium of 2.6 and acute kidney injury with BUN of 78 and Creatinine of 2.24, CTH and neck revealed comminuted fractures of bilateral nasal bones and soft tissue swelling right sternomastoid muscle with low attenuation collection and fluid level, (6:102), prevertebral soft tissue swelling, fused C5-C7 vertebra, linear lucencies concerning for fractures of C5, C6, C7 vertebra, horizontal fracture through osseous fused anterior longitudinal ligament C5-C6, (605:28, 604:20), correlate for hyperextension injury.  Given mechanism of injury and findings of multiple injuries, patient would benefit from pain control and consultation to plastic Sx for facial fractures and Orthopeadic surgery for cervical fractures. Nephrology was consulted as well for NURYS and recs were all followed. As per neurosurgery no acute neurosurgical intervention and custom c collar was delivered and placed. As per plastic surgery no immediate intervention was needed and he was advised to follow up in one week with . Internal medicine recs were followed as well. Cardiology was consulted, the EKG with no acute ischemia, V paced. -EP was called for PPM interrogation which was unremarkable. AC was held in setting of traumatic fall.   MRI cervical spine was performed which showed Loss of left cervical vertebral artery flow void concerning for occlusion/dissection, of indeterminate age. MRA of the neck for further evaluation and MRI of the brain to assess for possible infarctions was performed on 11/18. Neuro exam remained stable.  Patient was seen by PT which recommended home with home pt.   cardiology recommended resuming anticoagulation h/h stable ok by trauma   On day of discharge, the patient was tolerating diet, ambulating well and pain controlled.

## 2020-11-18 NOTE — DISCHARGE NOTE PROVIDER - PROVIDER TOKENS
PROVIDER:[TOKEN:[[id number]]],PROVIDER:[TOKEN:[[id number]],FOLLOWUP:[1 week]],PROVIDER:[TOKEN:[[id number]],FOLLOWUP:[1 week]] PROVIDER:[TOKEN:[[id number]],FOLLOWUP:[1 week]],PROVIDER:[TOKEN:[[id number]],FOLLOWUP:[1 week]],PROVIDER:[TOKEN:[[id number]],FOLLOWUP:[1 week]]

## 2020-11-18 NOTE — DISCHARGE NOTE PROVIDER - NSDCFUADDAPPT_GEN_ALL_CORE_FT
Please make an apt with your PCP in 1-2 weeks for follow up as well. Please make an appointment with Dr. Goldberg in 1-2 weeks for follow up as well. Please make an appointment with Dr. Goldberg in 1 week for follow up

## 2020-11-18 NOTE — DISCHARGE NOTE NURSING/CASE MANAGEMENT/SOCIAL WORK - PATIENT PORTAL LINK FT
You can access the FollowMyHealth Patient Portal offered by [facility] by registering at the following website: [email or web address]. By joining [facility]’s FollowMyHealth portal, you will also be able to view your health information using other applications (apps) compatible with our system.

## 2020-11-18 NOTE — PROGRESS NOTE ADULT - SUBJECTIVE AND OBJECTIVE BOX
[facility] NEPHROLOGY PRACTICE   MD Iron Jackson MD, D.O. Ruoru Wong, PA    From 7 AM - 5 PM:  OFFICE: [phone]  Dr. Ramsay cell: [phone]  Dr. Pichardo cell: [phone]  Dr. Prieto cell: [phone]  KATELYN Reese cell: [phone]    From 5 PM - 7 AM: Answering Service: [phone]  Date of service: 11-18-20 @ 10:29    RENAL FOLLOW UP NOTE  --------------------------------------------------------------------------------  HPI:  Pt seen and examined at bedside.       PAST HISTORY  --------------------------------------------------------------------------------  No significant changes to PMH, PSH, FHx, SHx, unless otherwise noted    ALLERGIES & MEDICATIONS  --------------------------------------------------------------------------------  Allergies    shellfish (Unknown)  sulfa drugs (Unknown)    Intolerances      Standing Inpatient Medications  aMIOdarone    Tablet 200 milliGRAM(s) Oral daily  aspirin  chewable 81 milliGRAM(s) Oral daily  colchicine 0.6 milliGRAM(s) Oral daily  enoxaparin Injectable 40 milliGRAM(s) SubCutaneous daily  furosemide    Tablet 20 milliGRAM(s) Oral daily  metolazone 5 milliGRAM(s) Oral daily  predniSONE   Tablet 10 milliGRAM(s) Oral daily  tamsulosin 0.4 milliGRAM(s) Oral at bedtime    PRN Inpatient Medications      REVIEW OF SYSTEMS  --------------------------------------------------------------------------------  General: no fever  CVS: no chest pain  RESP: no sob, no cough  ABD: no abdominal pain  : no dysuria  MSK: no edema     VITALS/PHYSICAL EXAM  --------------------------------------------------------------------------------  T(C): 36.3 (11-18-20 @ 09:45), Max: 36.5 (11-18-20 @ 05:40)  HR: 71 (11-18-20 @ 09:45) (69 - 71)  BP: 108/61 (11-18-20 @ 09:45) (108/61 - 123/68)  RR: 17 (11-18-20 @ 09:45) (17 - 18)  SpO2: 98% (11-18-20 @ 09:45) (96% - 99%)  Wt(kg): --    11-17-20 @ 07:01  -  11-18-20 @ 07:00  --------------------------------------------------------  IN: 560 mL / OUT: 720 mL / NET: -160 mL    11-18-20 @ 07:01  -  11-18-20 @ 10:29  --------------------------------------------------------  IN: 240 mL / OUT: 0 mL / NET: 240 mL      Physical Exam:  	Gen: NAD  	HEENT: MMM  	Pulm: CTA B/L  	CV: S1S2  	Abd: Soft, +BS  	Ext: No LE edema B/L                      Neuro: Awake   	Skin: Warm and Dry   	  LABS/STUDIES  --------------------------------------------------------------------------------              9.5    3.67  >-----------<  101      [11-18-20 @ 06:50]              29.9     140  |  101  |  43  ----------------------------<  121      [11-18-20 @ 06:50]  3.4   |  29  |  1.74        Ca     9.1     [11-18-20 @ 06:50]      Mg     2.1     [11-18-20 @ 06:50]      Phos  3.1     [11-18-20 @ 06:50]    Creatinine Trend:  SCr 1.74 [11-18 @ 06:50]  SCr 1.60 [11-17 @ 06:32]  SCr 1.46 [11-16 @ 06:53]  SCr 1.61 [11-15 @ 06:30]  SCr 1.36 [11-14 @ 06:30]    Urinalysis - [11-13-20 @ 19:29]      Color Light Yellow / Appearance Clear / SG 1.015 / pH 6.0      Gluc Negative / Ketone Negative  / Bili Negative / Urobili Negative       Blood Negative / Protein Negative / Leuk Est Negative / Nitrite Negative      RBC 0 / WBC 0 / Hyaline 1 / Gran  / Sq Epi  / Non Sq Epi 0 / Bacteria Negative

## 2020-11-18 NOTE — DISCHARGE NOTE PROVIDER - NSDCHHCONTACT_GEN_ALL_CORE_FT
61 As certified below, I, or a nurse practitioner or physician assistant working with me, had a face-to-face encounter that meets the physician face-to-face encounter requirements.

## 2020-11-18 NOTE — DISCHARGE NOTE PROVIDER - CARE PROVIDER_API CALL
Zita Siu)  Surgery  [address] [place] [address]  Phone: [phone]  Fax: [phone]  Follow Up Time:     Katelyn Hassan  NEUROSURGERY  [address] [place] [address], [address]  [place] [address]  Phone: [phone]  Fax: [phone]  Follow Up Time: 1 week    Stephanie Bartlett  PLASTIC SURGERY  [address] [facility], [address]  [place] [address]  Phone: [phone]  Fax: [phone]  Follow Up Time: 1 week   Katelyn Hassan  NEUROSURGERY  [address] [facility], [address]  [place] [address]  Phone: [phone]  Fax: [phone]  Follow Up Time: 1 week    Stephanie Bartlett  PLASTIC SURGERY  [address] [facility], [address]  [place] [address]  Phone: [phone]  Fax: [phone]  Follow Up Time: 1 week    Goldberg, Steven M (MD)  Cardiovascular Disease; Internal Medicine  [address] [place] [address]  Phone: [phone]  Fax: [phone]  Follow Up Time: 1 week

## 2020-11-18 NOTE — PROGRESS NOTE ADULT - ASSESSMENT
88 y/o m with pmhx afib on Xarelto, HAYLEE, BIBEMS s/p unwitnessed fall. Pt states that he slipped and fall on his wet hard wood floor.     NURYS on CKD vs. progressive CKD   Labs reviewed on [facility], last scr 1.6 in 10/2019    Pt admitted with elevated Scr to 2.2   Pt non-oliguric.   NURYS likely pre-renal.   SCr has stablized at pt's baseline  No need for further IVF   CT scan without hydronephrosis   Monitor urine output for hematuria   UA without protein or blood. No further w/u needed   Avoid nephrotoxins     Hypokalemia   serum K low   replete PO as need for k < 3.5     HTN  BP controlled    Anemia  Hb stable

## 2020-11-18 NOTE — DISCHARGE NOTE NURSING/CASE MANAGEMENT/SOCIAL WORK - NSSCTYPOFSERV_GEN_ALL_CORE
A nurse will contact you the day after discharge to set up an evaluation for nursing, physical therapy, and home health aide.

## 2020-11-18 NOTE — PROGRESS NOTE ADULT - SUBJECTIVE AND OBJECTIVE BOX
[facility] Cardiology Progress Note  _______________________________    Pt. seen and examined. No new cardiac-related complaints.    T(C): 36.5 (11-18-20 @ 05:40), Max: 36.5 (11-18-20 @ 05:40)  HR: 69 (11-18-20 @ 05:40) (69 - 69)  BP: 111/60 (11-18-20 @ 05:40) (111/60 - 123/68)  RR: 18 (11-18-20 @ 05:40) (18 - 18)  SpO2: 96% (11-18-20 @ 05:40) (96% - 99%)  I&O's Summary    17 Nov 2020 07:01  -  18 Nov 2020 07:00  --------------------------------------------------------  IN: 560 mL / OUT: 720 mL / NET: -160 mL        PHYSICAL EXAM:  GENERAL: Alert, NAD.  NECK: Supple  CHEST/LUNG: Clear to auscultation bilaterally; No wheezes, rales, or rhonchi.  HEART: S1 S2 normal, RRR; No murmurs, rubs, or gallops  ABDOMEN: Soft, Nondistended  EXTREMITIES:  No LE edema.      LABS:                        9.5    3.67  )-----------( 101      ( 18 Nov 2020 06:50 )             29.9     11-18    140  |  101  |  43<H>  ----------------------------<  121<H>  3.4<L>   |  29  |  1.74<H>    Ca    9.1      18 Nov 2020 06:50  Phos  3.1     11-18  Mg     2.1     11-18                    MEDICATIONS  (STANDING):  aMIOdarone    Tablet 200 milliGRAM(s) Oral daily  aspirin  chewable 81 milliGRAM(s) Oral daily  colchicine 0.6 milliGRAM(s) Oral daily  enoxaparin Injectable 40 milliGRAM(s) SubCutaneous daily  furosemide    Tablet 20 milliGRAM(s) Oral daily  metolazone 5 milliGRAM(s) Oral daily  predniSONE   Tablet 10 milliGRAM(s) Oral daily  tamsulosin 0.4 milliGRAM(s) Oral at bedtime    MEDICATIONS  (PRN):        RADIOLOGY & ADDITIONAL TESTS:

## 2020-11-18 NOTE — PROGRESS NOTE ADULT - PROBLEM SELECTOR PLAN 2
-stable  -hold Anticoagulation given recent fall and drop in h/h. will need to hold for the next few weeks. Will consider resuming as outpatient.    MRI cervical spine: Loss of left cervical vertebral artery flow void concerning for occlusion/dissection, of indeterminate age. Recommend CTA or MRA of the neck for further evaluation and MRI of the brain to assess for possible infarctions.    Patient of Dr. Steven Goldberg ([facility])    MELINA Beauchamp.ATA.  [phone]

## 2020-11-18 NOTE — PROGRESS NOTE ADULT - SUBJECTIVE AND OBJECTIVE BOX
Patient is a 89y old  Male who presents with a chief complaint of s/p fall (15 Nov 2020 12:12)      INTERVAL HPI/OVERNIGHT EVENTS: noted  pt seen and examined  c/o neck pain      Vital Signs Last 24 Hrs  T(C): 36.3 (18 Nov 2020 16:02), Max: 36.5 (18 Nov 2020 05:40)  T(F): 97.3 (18 Nov 2020 16:02), Max: 97.7 (18 Nov 2020 05:40)  HR: 85 (18 Nov 2020 16:02) (68 - 85)  BP: 115/71 (18 Nov 2020 16:02) (108/61 - 123/68)  BP(mean): --  RR: 18 (18 Nov 2020 16:02) (17 - 18)  SpO2: 95% (18 Nov 2020 16:02) (95% - 99%)    aMIOdarone    Tablet 200 milliGRAM(s) Oral daily  aspirin  chewable 81 milliGRAM(s) Oral daily  colchicine 0.6 milliGRAM(s) Oral daily  enoxaparin Injectable 40 milliGRAM(s) SubCutaneous daily  furosemide    Tablet 20 milliGRAM(s) Oral daily  metolazone 5 milliGRAM(s) Oral daily  potassium chloride    Tablet ER 20 milliEquivalent(s) Oral every 2 hours  predniSONE   Tablet 10 milliGRAM(s) Oral daily  tamsulosin 0.4 milliGRAM(s) Oral at bedtime      PHYSICAL EXAM:  GENERAL: NAD,   EYES: conjunctiva and sclera clear  ENMT: Moist mucous membranes  NECK:neck brace  CHEST/LUNG: non labored, cta b/l  HEART: Regular rate and rhythm; No murmurs, rubs, or gallops  ABDOMEN: Soft, Nontender, Nondistended; Bowel sounds present  EXTREMITIES:  2+ Peripheral Pulses, No clubbing, cyanosis, or edema  LYMPH: No lymphadenopathy noted  SKIN: No rashes or lesions    Consultant(s) Notes Reviewed:  [x ] YES  [ ] NO  Care Discussed with Consultants/Other Providers [ x] YES  [ ] NO    LABS:                        9.5    3.67  )-----------( 101      ( 18 Nov 2020 06:50 )             29.9     11-18    140  |  101  |  43<H>  ----------------------------<  121<H>  3.4<L>   |  29  |  1.74<H>    Ca    9.1      18 Nov 2020 06:50  Phos  3.1     11-18  Mg     2.1     11-18          CAPILLARY BLOOD GLUCOSE                  RADIOLOGY & ADDITIONAL TESTS:    Imaging Personally Reviewed:  [x ] YES  [ ] NO

## 2020-11-18 NOTE — DISCHARGE NOTE PROVIDER - NSDCCPCAREPLAN_GEN_ALL_CORE_FT
PRINCIPAL DISCHARGE DIAGNOSIS  Diagnosis: Cervical spine fracture  Assessment and Plan of Treatment: ACTIVITY: Please wear your fitted C-Collar.  No heavy lifting anything more than 10-15lbs or straining. Otherwise, you may return to your usual level of physical activity. If you are taking narcotic pain medication (such as Percocet), do NOT drive a car, operate machinery or make important decisions.  NOTIFY YOUR MD/RETURN TO ER IF: You have any bleeding that does not stop, any fever (over 100.4 F) or chills, or if your pain is not controlled on your discharge pain medications.  FOLLOW-UP:  Please follow up with DR. DE LOS SANTOS in one week regarding your hospitalization.      SECONDARY DISCHARGE DIAGNOSES  Diagnosis: Fall at home, initial encounter  Assessment and Plan of Treatment:     Diagnosis: Nasal bone fracture  Assessment and Plan of Treatment: Please follow up with plastic surgery in one week.     PRINCIPAL DISCHARGE DIAGNOSIS  Diagnosis: Cervical spine fracture  Assessment and Plan of Treatment: ACTIVITY: Please wear your fitted C-Collar at all times   No heavy lifting anything more than 10-15lbs or straining. Otherwise, you may return to your usual level of physical activity. If you are taking narcotic pain medication (such as Percocet), do NOT drive a car, operate machinery or make important decisions.  NOTIFY YOUR MD/RETURN TO ER IF: You have any bleeding that does not stop, any fever (over 100.4 F) or chills, or if your pain is not controlled on your discharge pain medications.  FOLLOW-UP:  Please follow up with DR. LUQUE spine doctor in one week regarding your hospitalization.  you can contact [facility] [phone] if you have any problems with your collar      SECONDARY DISCHARGE DIAGNOSES  Diagnosis: Atrial fibrillation, unspecified type  Assessment and Plan of Treatment: Follow up with your cardiologist Dr. Goldberg outpatient in 1 week   Please notify your cardiologist that you have been seen by cardiology in patient and your pacemaker was interrogated.  may resume alexis    Diagnosis: Fall at home, initial encounter  Assessment and Plan of Treatment:     Diagnosis: Nasal bone fracture  Assessment and Plan of Treatment: Please follow up with plastic surgery Dr. Thomas in one week, please call to set up appointment        PRINCIPAL DISCHARGE DIAGNOSIS  Diagnosis: Cervical spine fracture  Assessment and Plan of Treatment: ACTIVITY: Please wear your fitted C-Collar at all times do NOT remove collar   No heavy lifting anything more than 10-15lbs or straining. Otherwise, you may return to your usual level of physical activity. If you are taking narcotic pain medication (such as Percocet), do NOT drive a car, operate machinery or make important decisions.  NOTIFY YOUR MD/RETURN TO ER IF: You have any bleeding that does not stop, any fever (over 100.4 F) or chills, or if your pain is not controlled on your discharge pain medications.  FOLLOW-UP:  Please follow up with DR. LUQUE spine doctor in one week for follow up appointment, chelsea call to schedule an appointment   you can contact [facility] [phone] if you have any problems with your collar  Please follow up with your regular medical doctor in 1 week, please call to schedule an appointment to discuss recent hospitalization        SECONDARY DISCHARGE DIAGNOSES  Diagnosis: Atrial fibrillation, unspecified type  Assessment and Plan of Treatment: Follow up with your internist/cardiologist Dr. Goldberg outpatient in 1 week   Please notify your cardiologist that you have been seen by cardiology in patient and your pacemaker was interrogated.  may resume eluis    Diagnosis: Nasal bone fracture  Assessment and Plan of Treatment: Please follow up with plastic surgery Dr. Thomas in one week, please call to set up appointment for suture removal or Dr. Goldberg may remove in office       Diagnosis: Fall at home, initial encounter  Assessment and Plan of Treatment:

## 2020-11-18 NOTE — DISCHARGE NOTE PROVIDER - CARE PROVIDERS DIRECT ADDRESSES
,DirectAddress_Unknown,[email or web address],DirectAddress_Unknown ,[email or web address],DirectAddress_Unknown,[email or web address]

## 2020-11-18 NOTE — PROGRESS NOTE ADULT - ASSESSMENT
89M PMH AFib on xarelto, HAYLEE presenting s/p unwitnessed mechanical fall found to have nasal bone fracture, cervical spine fractures, and right SCM hematoma. medicine consult for co management.    # AFib  # HAYLEE  # unwitnessed mechanical fall  # nasal bone fx  # C5-7 fx  # NURYS on CKD 3  # chronic thrombocytopenia  # anemia    appreciate trauma recs  ro syncope, pt denies LOC but unwitnessed  TTE, check orthostatics  PT OOB  cardio following, TTE reviewed- no major changes  holding AC, monitor h/h  ICD interrogation no acute events  appreciate NSG recs, pending MRI cervical  plastics no acute intervention  creat baseline 1.4 from  EMR labs 10/2020  baseline platelets 115 from 10/2020 labs  incentive spirometry ordered, no signs of pna, likely atelectasis    PCP Dr. Steven Goldberg    Please call [facility] with questions [phone].

## 2020-11-18 NOTE — PROCEDURE NOTE - ADDITIONAL PROCEDURE DETAILS
PRE MRI SCAN: Spine  1) Presenting rhythm: AF  2) Battery Longevity: 8 years remaining  3) Changes Made: MRI mode turned on, reprogrammed to VOO 70  5) Call post MRI to reprogram.    #[id number]
POST  1) Presenting Rhythm:  70  2) Measured data WNL.   3) Changed Made: MRI Mode turned off. Original pacing mode settings restored .      [id number]
1. Normal PPM function with pacing/sensing thresholds and impedances WNL. Atrial pacing thresholds not performed secondary to patient in AF  2. Battery longevity is 8years 1month  3. Patient is not pacemaker dependent  4. No events to correlate to patient's symptoms/fall/?syncope. Patient in AF since at least 11/4 with controlled VR's (<100bpm)
PRE-MRI  1) Presenting rhythm: Afib/ V pace 70's  2) Measured data WNL, NL PM function, Not PM dependent  3) Changes made: PPM programed to MRI mode with pacing changed to VOO at 80 bpm  4) Call post MRI [place]  [id number]
Post MRI  1) Presenting rhythm: Afib/V pace at 80 bpm   2) Measured data WNL, NL PM function, Not PM dependent.  3) Changes made: PPM reprogrammed back to baseline setting with MRI mode off and pacing mode back to DDD  bpm    RAMBO Kim, NP  [phone]

## 2020-11-18 NOTE — PROGRESS NOTE ADULT - ASSESSMENT
89M w/ PMHx of aFib on Xarelto, HAYLEE, presenting to [facility] after suffering a unwitnessed mechanical fall.

## 2020-11-18 NOTE — PROGRESS NOTE ADULT - ASSESSMENT
Assessment:   89M presenting s/p mechanical fall found to have nasal bone fracture cervical spine fractures, and right SCM hematoma. Hemodynamically stable. MRI findings consistent with loss of left cervical vertebral artery flow void concerning for occlusion/dissection, of indeterminate age and ligamentous injury between C6-C7.     Plan:  - Diet as tolerated  - Pain control   - DVT PPx: Lovenox  - MR-A Neck, Patient's pacemaker compatible with MRI  - Appreciate Plastic's recs: No immediate intervention for nasal bone fracture. Patient can f/u as outpatient one week after discharge.  - Nephrology following, appreciate their recs  - echocadiogram ordered        Acute Care Surgery   [id number]

## 2020-11-19 VITALS
TEMPERATURE: 97 F | DIASTOLIC BLOOD PRESSURE: 70 MMHG | OXYGEN SATURATION: 98 % | RESPIRATION RATE: 17 BRPM | HEART RATE: 76 BPM | SYSTOLIC BLOOD PRESSURE: 127 MMHG

## 2020-11-19 PROCEDURE — 80048 BASIC METABOLIC PNL TOTAL CA: CPT

## 2020-11-19 PROCEDURE — 80053 COMPREHEN METABOLIC PANEL: CPT

## 2020-11-19 PROCEDURE — 70486 CT MAXILLOFACIAL W/O DYE: CPT

## 2020-11-19 PROCEDURE — 70549 MR ANGIOGRAPH NECK W/O&W/DYE: CPT

## 2020-11-19 PROCEDURE — 70544 MR ANGIOGRAPHY HEAD W/O DYE: CPT

## 2020-11-19 PROCEDURE — U0003: CPT

## 2020-11-19 PROCEDURE — 70450 CT HEAD/BRAIN W/O DYE: CPT

## 2020-11-19 PROCEDURE — 74176 CT ABD & PELVIS W/O CONTRAST: CPT

## 2020-11-19 PROCEDURE — 72170 X-RAY EXAM OF PELVIS: CPT

## 2020-11-19 PROCEDURE — 82962 GLUCOSE BLOOD TEST: CPT

## 2020-11-19 PROCEDURE — 85025 COMPLETE CBC W/AUTO DIFF WBC: CPT

## 2020-11-19 PROCEDURE — 81001 URINALYSIS AUTO W/SCOPE: CPT

## 2020-11-19 PROCEDURE — 97530 THERAPEUTIC ACTIVITIES: CPT

## 2020-11-19 PROCEDURE — 90715 TDAP VACCINE 7 YRS/> IM: CPT

## 2020-11-19 PROCEDURE — 84484 ASSAY OF TROPONIN QUANT: CPT

## 2020-11-19 PROCEDURE — 73080 X-RAY EXAM OF ELBOW: CPT

## 2020-11-19 PROCEDURE — 83735 ASSAY OF MAGNESIUM: CPT

## 2020-11-19 PROCEDURE — 86769 SARS-COV-2 COVID-19 ANTIBODY: CPT

## 2020-11-19 PROCEDURE — 85027 COMPLETE CBC AUTOMATED: CPT

## 2020-11-19 PROCEDURE — 97116 GAIT TRAINING THERAPY: CPT

## 2020-11-19 PROCEDURE — 12011 RPR F/E/E/N/L/M 2.5 CM/<: CPT

## 2020-11-19 PROCEDURE — 85610 PROTHROMBIN TIME: CPT

## 2020-11-19 PROCEDURE — 71046 X-RAY EXAM CHEST 2 VIEWS: CPT

## 2020-11-19 PROCEDURE — 72141 MRI NECK SPINE W/O DYE: CPT

## 2020-11-19 PROCEDURE — 76377 3D RENDER W/INTRP POSTPROCES: CPT

## 2020-11-19 PROCEDURE — 93306 TTE W/DOPPLER COMPLETE: CPT

## 2020-11-19 PROCEDURE — 71250 CT THORAX DX C-: CPT

## 2020-11-19 PROCEDURE — 99232 SBSQ HOSP IP/OBS MODERATE 35: CPT

## 2020-11-19 PROCEDURE — 93005 ELECTROCARDIOGRAM TRACING: CPT | Mod: XU

## 2020-11-19 PROCEDURE — 71045 X-RAY EXAM CHEST 1 VIEW: CPT

## 2020-11-19 PROCEDURE — 85730 THROMBOPLASTIN TIME PARTIAL: CPT

## 2020-11-19 PROCEDURE — 84100 ASSAY OF PHOSPHORUS: CPT

## 2020-11-19 PROCEDURE — 97161 PT EVAL LOW COMPLEX 20 MIN: CPT

## 2020-11-19 PROCEDURE — 99291 CRITICAL CARE FIRST HOUR: CPT | Mod: 25

## 2020-11-19 PROCEDURE — 70553 MRI BRAIN STEM W/O & W/DYE: CPT

## 2020-11-19 PROCEDURE — 83605 ASSAY OF LACTIC ACID: CPT

## 2020-11-19 PROCEDURE — 72125 CT NECK SPINE W/O DYE: CPT

## 2020-11-19 PROCEDURE — 83690 ASSAY OF LIPASE: CPT

## 2020-11-19 PROCEDURE — 80307 DRUG TEST PRSMV CHEM ANLYZR: CPT

## 2020-11-19 RX ORDER — APIXABAN 2.5 MG/1
2.5 TABLET, FILM COATED ORAL
Refills: 0 | Status: DISCONTINUED | OUTPATIENT
Start: 2020-11-19 | End: 2020-11-19

## 2020-11-19 RX ORDER — APIXABAN 2.5 MG/1
1 TABLET, FILM COATED ORAL
Qty: 0 | Refills: 0 | DISCHARGE
Start: 2020-11-19

## 2020-11-19 RX ADMIN — Medication 0.6 MILLIGRAM(S): at 12:30

## 2020-11-19 RX ADMIN — Medication 20 MILLIGRAM(S): at 06:09

## 2020-11-19 RX ADMIN — ENOXAPARIN SODIUM 40 MILLIGRAM(S): 100 INJECTION SUBCUTANEOUS at 12:25

## 2020-11-19 RX ADMIN — Medication 10 MILLIGRAM(S): at 06:09

## 2020-11-19 RX ADMIN — AMIODARONE HYDROCHLORIDE 200 MILLIGRAM(S): 400 TABLET ORAL at 06:09

## 2020-11-19 NOTE — PROGRESS NOTE ADULT - ASSESSMENT
89M w/ PMHx of aFib on Xarelto, HAYLEE, presenting to [facility] after suffering a unwitnessed mechanical fall.

## 2020-11-19 NOTE — PROGRESS NOTE ADULT - ASSESSMENT
88 y/o m with pmhx afib on Xarelto, HAYLEE, BIBEMS s/p unwitnessed fall. Pt states that he slipped and fall on his wet hard wood floor.     NURYS on CKD vs. progressive CKD   Labs reviewed on [facility], last scr 1.6 in 10/2019    Pt admitted with elevated Scr to 2.2   Pt non-oliguric.   NURYS likely pre-renal.   SCr has stablized at pt's baseline  No need for further IVF   Pt on lasix and metolazone   CT scan without hydronephrosis   Monitor urine output for hematuria   UA without protein or blood. No further w/u needed   Avoid nephrotoxins     Hypokalemia   serum K low   replete PO as need for k < 3.5     HTN  BP controlled    Anemia  Hb stable

## 2020-11-19 NOTE — PROGRESS NOTE ADULT - SUBJECTIVE AND OBJECTIVE BOX
ACUTE CARE SURGERY PROGRESS NOTE    Subjective:   Patient seen and examined at bedside during AM rounds. No acute events overnight.     Objective:  Vital Signs  T(C): 36.4 (11-19 @ 05:34), Max: 36.7 (11-19 @ 01:52)  HR: 70 (11-19 @ 06:07) (67 - 85)  BP: 108/63 (11-19 @ 06:07) (108/63 - 124/71)  RR: 18 (11-19 @ 05:34) (17 - 18)  SpO2: 97% (11-19 @ 05:56) (95% - 99%)  11-18-20 @ 07:01  -  11-19-20 @ 07:00  --------------------------------------------------------  IN: 440 mL / OUT: 525 mL / NET: -85 mL        Physical Exam:  General Appearance: Resting comfortably, no acute distress  HEENT: Eye laceration repaired with sutures intact.   Chest: non-labored breathing, no respiratory distress  Abdomen: Soft, non-tender, non-distended    Labs:                        9.5    3.67  )-----------( 101      ( 18 Nov 2020 06:50 )             29.9   11-18    140  |  101  |  43<H>  ----------------------------<  121<H>  3.4<L>   |  29  |  1.74<H>    Ca    9.1      18 Nov 2020 06:50  Phos  3.1     11-18  Mg     2.1     11-18      CAPILLARY BLOOD GLUCOSE          Medications:   MEDICATIONS  (STANDING):  aMIOdarone    Tablet 200 milliGRAM(s) Oral daily  colchicine 0.6 milliGRAM(s) Oral daily  enoxaparin Injectable 40 milliGRAM(s) SubCutaneous daily  furosemide    Tablet 20 milliGRAM(s) Oral daily  metolazone 5 milliGRAM(s) Oral daily  predniSONE   Tablet 10 milliGRAM(s) Oral daily  tamsulosin 0.4 milliGRAM(s) Oral at bedtime    MEDICATIONS  (PRN):      IMAGING:    < from: MR Angio Head No Cont (11.18.20 @ 17:02) >  EXAM:  MR ANGIO NECK WAW IC                          EXAM:  MR BRAIN WAW IC                          EXAM:  MR ANGIO BRAIN                            PROCEDURE DATE:  11/18/2020        INTERPRETATION:  Contrast-enhanced MRI of the brain and MRAof the neck. Noncontrast MRA of the brain.    CLINICAL INDICATION: vetebral artery dissection versus occlusion    TECHNIQUE: Multiplanar multisequence MRI of the brain and 3-D time of flight images through the neck were obtained before and after the dynamic, intravenous administration of  7 cc of Gadavist. 0.5 cc were discarded.  Noncontrast 3-D time-of-flight images through the brain and 2-D time of flight images through the neck were obtained on the same date.  Time of flight images were reformatted using an MIP protocol targeted over the head and neck.    COMPARISON: CT brain 11/13/2020. MRI cervical spine 11/17/2020    FINDINGS:    MRI BRAIN:    There is no hydrocephalus, midline shift, mass effect, vasogenic edema, or acute intracranial hemorrhage.  Diffusion weighted images demonstrate no acute territorial infarct.  There is no focal parenchymal signal abnormality.    Flow voids are seen within the major intracranial vessels consistent with their patency.    No abnormal parenchymal or leptomeningeal enhancement.    The visualized paranasal sinuses and mastoid air cells are clear.    The orbits, sellar and suprasellar structures, and craniocervical junction are unremarkable.    MRA BRAIN:    Good flow-related signal within the bilateral anterior, middle, and posterior cerebral arteries, and within the skull base/intracranial internal carotid arteries.    Good flow-related signal within the intradural vertebral arteries and the basilar artery.    No flow-limiting stenosis or vascular aneurysm. Anterior communicating artery is present.    Bilateral posterior communicating arteries are present, the right P1 segment is hypoplastic.    MRA NECK:    Stenoses described in this report are on the basis of NASCET criteria.    Lack of flow related enhancement of the left vertebral artery origin and proximal left vertebral artery. Reconstitution of the vessel proximally. Multifocal stenoses of the vessel throughout most of its course in the neck. Normal flow-related enhancement of the V3 segment of the left vertebral artery. This does not have the appearance of arterial dissection.    Approximately 50% stenosis of the proximal left internal carotid artery beginning just distal to its origin. Normal flow-related enhancement ofthe more distal left internal carotid artery in the neck.    Normal flow-related enhancement of the bilateral common carotid, right internal carotid, and right vertebral arteries.    Origins of the bilateral common carotid, bilateral internal carotid, and right vertebral arteries are unremarkable.    IMPRESSION:    MRI BRAIN:  No hydrocephalus, mass effect, acute intracranial hemorrhage, vasogenic edema, or acute territorial infarct.  No abnormal parenchymal or leptomeningeal enhancement.    MRA BRAIN:  No flow-limiting stenosis or vascular aneurysm.    MRA NECK:  Lack of flow related enhancement of the left vertebral artery origin and proximal left vertebral artery. Reconstitution of the vessel proximally. Multifocal stenoses of the vessel throughout most of its course in the neck. Normal flow-related enhancement of the V3 segment of the left vertebral artery. Findings may represent the presence of fibromuscular dysplasia or atherosclerosis. This does not have the appearance of arterial dissection.    Approximately 50% stenosis of the proximal left internal carotid artery beginning just distal to its origin. Normal flow-related enhancement of the more distal left internal carotid artery in the neck.          Patient name: SUNNY RUIZ  YOB: 1931   Age: 89 (M)   MR#: [id number]  Study Date: 11/17/2020  [facility]  Study quality: Technically fair  Referring Physician: Morgan Lane MD  Blood Pressure: 110/65 mmHg  Height: 178 cm  Weight: 84 kg  BSA: 2 m2  Heart Rate: 69 mmHg  ------------------------------------------------------------------------  PROCEDURE: Transthoracic echocardiogram with 2-D, M-Mode  and complete spectral and color flow Doppler.  INDICATION: Syncope and collapse (R55)  ------------------------------------------------------------------------  Dimensions:    Normal Values:  LA:     4.0    2.0 - 4.0 cm  Ao:     3.6    2.0 - 3.8 cm  SEPTUM: 1.3    0.6 - 1.2 cm  PWT:    0.9    0.6 - 1.1 cm  LVIDd:  3.9    3.0 - 5.6 cm  LVIDs:  2.4    1.8 - 4.0 cm  Derived variables:  LVMI: 76 g/m2  RWT: 0.48  Fractional short: 38 %  EF (Visual Estimate): 65-70 %  EF (Casillas Rule): 69 %Doppler Peak Velocity (m/sec):  AoV=1.3  ------------------------------------------------------------------------  Observations:  Mitral Valve: Mitral annular calcification and calcified  mitral leaflets with normal diastolic opening. Mild mitral  regurgitation.  Aortic Valve/Aorta: Calcified trileaflet aortic valve with  normal opening. Peak transaortic valve gradient equals 7 mm  Hg, estimated aortic valve area equals 2.2 sqcm. Minimal  aortic regurgitation.  Peak left ventricular outflow tract  gradient equals 2 mm Hg.  Aortic Root: 3.6 cm.  LVOT diameter: 2.2 cm.  Left Atrium: Moderately dilated left atrium.  LA volume  index = 47 cc/m2.  Left Ventricle: Hyperdynamic left ventricular systolic  function. Increased relative wall thickness with normal  left ventricular mass index, consistent with concentric  left ventricular remodeling. Normal diastolic function  Right Heart: Normal right atrium. The right ventricle is  not well visualized; grossly normal right ventricular  systolic function. TV s' = 9 cm/sec.  Tricuspid valve not  well visualized, probably normal. Mild tricuspid  regurgitation. Pulmonic valve not well visualized, probably  normal. Mild pulmonic regurgitation.  Pericardium/Pleura: Normal pericardium with no pericardial  effusion.  Hemodynamic: Estimated right atrial pressure is 8 mm Hg.  Estimated right ventricular systolic pressure equals 31 mm  Hg, assuming right atrial pressure equals 8 mm Hg,  consistent with normal pulmonary pressures.  ------------------------------------------------------------------------  Conclusions:  1. Mitral annular calcification and calcified mitral  leaflets with normal diastolic opening. Mild mitral  regurgitation.  2. Increased relative wall thickness with normal left  ventricular mass index, consistent with concentric left  ventricular remodeling.  3. Hyperdynamic left ventricular systolic function.  4. The right ventricle is not well visualized; grossly  normal right ventricular systolic function. TV s' = 9  cm/sec.  5. Estimated right ventricular systolic pressure equals 31  mm Hg, assuming right atrial pressure equals 8 mm Hg,  consistent with normal pulmonary pressures.  *** Compared with echocardiogram of 4/18/2012, no  pericardial effusion is seen on the present study. Other  findings are grossly similar.  ------------------------------------------------------------------------  Confirmed on  11/17/2020 - 19:33:36 by Toby White M.D.  -------------------------------------------------------      YOLANDA MORRIS MD; Attending Radiologist  This document has been electronically signed. Nov 18 2020  5:11PM    < end of copied text >

## 2020-11-19 NOTE — PROGRESS NOTE ADULT - ATTENDING COMMENTS
ATTENDING ATTESTATION  I have seen and examined this patient on rounds thismorning with the red surgery team. I have reviewed all new labs, imaging and reports. I have participated in formulating the plan for the day, and have read and agree with the history, ROS, exam, assessment and plan as stated above.     MRA of the neck yesterday showed no BCVI, ASA has been stopped.   Continued discussion with cardiology regarding risk vs benefit of restarting Xerelto in the patient who is high risk for falls.   Medically stable for discharge today, has appropriate follow up with Nsx and cardiology.     Total time spent in the care of this patient today (excluding critical care & procedures): 25 min                 Over 50% of the total time was spent in discussion and coordination of care with consulting services, dietary and rehab services.     Zita Siu M.D., M.S.  Dept of Trauma, Acute and Critical Care
ATTENDING ATTESTATION  I have seen and examined this patient on rounds thismorning with the surgery team. I have reviewed all new labs, imaging and reports. I have participated in formulating the plan for the day, and have read and agree with the history, ROS, exam, assessment and plan as stated above.     Ill-fitted hard collar, will replace with smaller collar. Awaiting MRI cpsine for C5/C6 vert body facture with suspected ALL dispruction. No planned surgical intervention, will need Nsx follow up.   Has complained of recent intermittent dizziness at home, which was responsible for his fall. Pacer for afib for interrogation today. EKG shows no new acute STTW changes. ECHO to evaluate for cardiac function. Will discuss further plan with his private cardiologist who follows his inpatient care.   NURYS improving. Avoiding nephrotoxic agents.   OK for DVT ppx, will discuss restarting Xerelto for afib (vs. risk of falling again).     Total time spent in the care of this patient today (excluding critical care & procedures): 55 min                 Over 50% of the total time was spent in discussion and coordination of care with consulting services, dietary and rehab services.     Zita Siu M.D., M.S.  Dept of Trauma, Acute and Critical Care
ATTENDING ATTESTATION  I have seen and examined this patient on rounds thismorning with the surgery team. I have reviewed all new labs, imaging and reports. I have participated in formulating the plan for the day, and have read and agree with the history, ROS, exam, assessment and plan as stated above.     MRI cspine shows ligaments injury associated with his C5/C6 vert body fx. Collar remains in place.   MRI also suspicious for BCVI, planned for MRA today to further eval. Started on ASA for BCVI.   TTE normal as part of syncope workup.    Total time spent in the care of this patient today (excluding critical care & procedures): 35 min                 Over 50% of the total time was spent in discussion and coordination of care with consulting services, dietary and rehab services.     Zita Siu M.D., M.S.  Dept of Trauma, Acute and Critical Care
ATTENDING ATTESTATION  I have seen and examined this patient on rounds thismorning with the surgery team. I have reviewed all new labs, imaging and reports. I have participated in formulating the plan for the day, and have read and agree with the history, ROS, exam, assessment and plan as stated above.     MRI today @ 1pm to eval for ligamentous injury associated with C5/C6 fractures. C-collar has been replaced and is better-fitting now. Remains neurologically intact.   ECHO ordered for further workup for dizziness with syncope as the inciting event for this trauma admission.   Patient has no trauma-related contraindication to restarted Xerelto (for afib). However, there is an elevated risk of head bleed on anticoagulation should he have additional falls. Will discuss this risk vs. benefits with his cardiologist.     Total time spent in the care of this patient today (excluding critical care & procedures): 35 min                 Over 50% of the total time was spent in discussion and coordination of care with consulting services, dietary and rehab services.     Zita Siu M.D., M.S.  Dept of Trauma, Acute and Critical Care
Dr. Torres will be covering tomorrow.  Please contact with any questions or concerns [phone].
agree with the above assessment and plan by QUINN Morales.  cv stable  Interrogate PPM     check echo to eval LVEF, valve function   MRI pending  AC on hold in setting of traumatic fall , drop in h/h noted  - management per trauma surg
resting comfortable with cervical collar in place  Aspen collar  plan MRI per spine team  appreciate medical / renal input  no acute therapy needed for nasal bone fracture  will need PT evaluation
comfortable with cervical collar applied  will get Physical therapy evaluation  Need to assess compatibility of pacemaker before considering MRI  admitted to trauma

## 2020-11-19 NOTE — PROGRESS NOTE ADULT - ASSESSMENT
89M PMH AFib on xarelto, HAYLEE presenting s/p unwitnessed mechanical fall found to have nasal bone fracture, cervical spine fractures, and right SCM hematoma. medicine consult for co management.    # AFib  # HAYLEE  # unwitnessed mechanical fall  # nasal bone fx  # C5-7 fx  # NURYS on CKD 3  # chronic thrombocytopenia  # anemia    appreciate trauma recs  ro syncope, pt denies LOC but unwitnessed  TTE, check orthostatics  PT OOB  cardio following, TTE reviewed- no major changes  holding AC, monitor h/h  ICD interrogation no acute events- pAfib  appreciate NSG recs,   MRI/MRA head and neck reviewed  plastics no acute intervention  creat baseline 1.4 from [facility] EMR labs 10/2020  baseline platelets 115 from 10/2020 labs  incentive spirometry ordered, no signs of pna, likely atelectasis    PCP Dr. Steven Goldberg    [facility] [phone].

## 2020-11-19 NOTE — PROGRESS NOTE ADULT - ASSESSMENT
Assessment:   89M presenting s/p mechanical fall found to have nasal bone fracture cervical spine fractures, and right SCM hematoma. Hemodynamically stable. MRI findings consistent with loss of left cervical vertebral artery flow void concerning for occlusion/dissection, of indeterminate age and ligamentous injury between C6-C7. MRA without evidence of BCVI, however 50% stenosis of left ICA, which is non-flow-limiting.    Plan:  - Diet as tolerated  - Pain control   - DVT PPx: Lovenox  - Will need follow up for IC  - Appreciate Plastic's recs: No immediate intervention for nasal bone fracture. Patient can f/u as outpatient one week after discharge.  - Appreciate cardiology recs: will restart Eliquis 2.5mg daily  - Nephrology following, appreciate their recs      Tino Morgan, PGY-1  Acute Care Surgery  [phone]

## 2020-11-19 NOTE — PROGRESS NOTE ADULT - SUBJECTIVE AND OBJECTIVE BOX
[facility] Cardiology Progress Note  _______________________________    Pt. seen and examined. No new cardiac-related complaints.    T(C): 36.4 (11-19-20 @ 05:34), Max: 36.7 (11-19-20 @ 01:52)  HR: 70 (11-19-20 @ 06:07) (67 - 85)  BP: 108/63 (11-19-20 @ 06:07) (108/63 - 124/71)  RR: 18 (11-19-20 @ 05:34) (17 - 18)  SpO2: 97% (11-19-20 @ 05:56) (95% - 99%)  I&O's Summary    18 Nov 2020 07:01  -  19 Nov 2020 07:00  --------------------------------------------------------  IN: 440 mL / OUT: 525 mL / NET: -85 mL        PHYSICAL EXAM:  GENERAL: Alert, NAD.  NECK: Supple  CHEST/LUNG: Clear to auscultation bilaterally; No wheezes, rales, or rhonchi.  HEART: S1 S2 normal, RRR; No murmurs, rubs, or gallops  ABDOMEN: Soft, Nondistended  EXTREMITIES:  No LE edema.      LABS:                        9.5    3.67  )-----------( 101      ( 18 Nov 2020 06:50 )             29.9     11-18    140  |  101  |  43<H>  ----------------------------<  121<H>  3.4<L>   |  29  |  1.74<H>    Ca    9.1      18 Nov 2020 06:50  Phos  3.1     11-18  Mg     2.1     11-18                    MEDICATIONS  (STANDING):  aMIOdarone    Tablet 200 milliGRAM(s) Oral daily  colchicine 0.6 milliGRAM(s) Oral daily  enoxaparin Injectable 40 milliGRAM(s) SubCutaneous daily  furosemide    Tablet 20 milliGRAM(s) Oral daily  metolazone 5 milliGRAM(s) Oral daily  predniSONE   Tablet 10 milliGRAM(s) Oral daily  tamsulosin 0.4 milliGRAM(s) Oral at bedtime    MEDICATIONS  (PRN):        RADIOLOGY & ADDITIONAL TESTS:

## 2020-11-19 NOTE — PROGRESS NOTE ADULT - SUBJECTIVE AND OBJECTIVE BOX
[facility]   MD Iron Jackson MD, D.O. Ruoru Wong, PA    From 7 AM - 5 PM:  OFFICE: [phone]  Dr. Ramsay cell: [phone]  Dr. Pichardo cell: [phone]  Dr. Prieto cell: [phone]  KATELYN Reese cell: [phone]    From 5 PM - 7 AM: Answering Service: [phone]  Date of service: 11-19-20 @ 10:06    RENAL FOLLOW UP NOTE  --------------------------------------------------------------------------------  HPI:  Pt seen and examined at bedside.   Joseies SOB, chest pain     PAST HISTORY  --------------------------------------------------------------------------------  No significant changes to PMH, PSH, FHx, SHx, unless otherwise noted    ALLERGIES & MEDICATIONS  --------------------------------------------------------------------------------  Allergies    shellfish (Unknown)  sulfa drugs (Unknown)    Intolerances      Standing Inpatient Medications  aMIOdarone    Tablet 200 milliGRAM(s) Oral daily  colchicine 0.6 milliGRAM(s) Oral daily  enoxaparin Injectable 40 milliGRAM(s) SubCutaneous daily  furosemide    Tablet 20 milliGRAM(s) Oral daily  metolazone 5 milliGRAM(s) Oral daily  predniSONE   Tablet 10 milliGRAM(s) Oral daily  tamsulosin 0.4 milliGRAM(s) Oral at bedtime    PRN Inpatient Medications      REVIEW OF SYSTEMS  --------------------------------------------------------------------------------  General: no fever  CVS: no chest pain  RESP: no sob, no cough  ABD: no abdominal pain  : no dysuria,  MSK: no edema     VITALS/PHYSICAL EXAM  --------------------------------------------------------------------------------  T(C): 36.4 (11-19-20 @ 05:34), Max: 36.7 (11-19-20 @ 01:52)  HR: 70 (11-19-20 @ 06:07) (67 - 85)  BP: 108/63 (11-19-20 @ 06:07) (108/63 - 124/71)  RR: 18 (11-19-20 @ 05:34) (17 - 18)  SpO2: 97% (11-19-20 @ 05:56) (95% - 99%)  Wt(kg): --        11-18-20 @ 07:01  -  11-19-20 @ 07:00  --------------------------------------------------------  IN: 440 mL / OUT: 525 mL / NET: -85 mL      Physical Exam:  	Gen: NAD  	HEENT: MMM Neck collar   	Pulm: CTA B/L  	CV: S1S2  	Abd: Soft, +BS  	Ext: No LE edema B/L                      Neuro: Awake   	Skin: Warm and Dry       LABS/STUDIES  --------------------------------------------------------------------------------              9.5    3.67  >-----------<  101      [11-18-20 @ 06:50]              29.9     140  |  101  |  43  ----------------------------<  121      [11-18-20 @ 06:50]  3.4   |  29  |  1.74        Ca     9.1     [11-18-20 @ 06:50]      Mg     2.1     [11-18-20 @ 06:50]      Phos  3.1     [11-18-20 @ 06:50]      Creatinine Trend:  SCr 1.74 [11-18 @ 06:50]  SCr 1.60 [11-17 @ 06:32]  SCr 1.46 [11-16 @ 06:53]  SCr 1.61 [11-15 @ 06:30]  SCr 1.36 [11-14 @ 06:30]    Urinalysis - [11-13-20 @ 19:29]      Color Light Yellow / Appearance Clear / SG 1.015 / pH 6.0      Gluc Negative / Ketone Negative  / Bili Negative / Urobili Negative       Blood Negative / Protein Negative / Leuk Est Negative / Nitrite Negative      RBC 0 / WBC 0 / Hyaline 1 / Gran  / Sq Epi  / Non Sq Epi 0 / Bacteria Negative

## 2020-11-19 NOTE — PROGRESS NOTE ADULT - SUBJECTIVE AND OBJECTIVE BOX
Patient is a 89y old  Male who presents with a chief complaint of s/p fall (15 Nov 2020 12:12)      INTERVAL HPI/OVERNIGHT EVENTS: noted  pt seen and examined  feels well, oob to chair  pain well controlled    Vital Signs Last 24 Hrs  T(C): 36.3 (19 Nov 2020 11:45), Max: 36.7 (19 Nov 2020 01:52)  T(F): 97.4 (19 Nov 2020 11:45), Max: 98 (19 Nov 2020 01:52)  HR: 76 (19 Nov 2020 11:45) (67 - 85)  BP: 127/70 (19 Nov 2020 11:45) (108/63 - 127/70)  BP(mean): --  RR: 17 (19 Nov 2020 11:45) (17 - 18)  SpO2: 98% (19 Nov 2020 11:45) (95% - 99%)    aMIOdarone    Tablet 200 milliGRAM(s) Oral daily  colchicine 0.6 milliGRAM(s) Oral daily  enoxaparin Injectable 40 milliGRAM(s) SubCutaneous daily  furosemide    Tablet 20 milliGRAM(s) Oral daily  metolazone 5 milliGRAM(s) Oral daily  predniSONE   Tablet 10 milliGRAM(s) Oral daily  tamsulosin 0.4 milliGRAM(s) Oral at bedtime      PHYSICAL EXAM:  GENERAL: NAD,   EYES: conjunctiva and sclera clear  ENMT: Moist mucous membranes  NECK: Supple, No JVD, Normal thyroid  CHEST/LUNG: non labored, cta b/l  HEART: Regular rate and rhythm; No murmurs, rubs, or gallops  ABDOMEN: Soft, Nontender, Nondistended; Bowel sounds present  EXTREMITIES:  2+ Peripheral Pulses, No clubbing, cyanosis, or edema  LYMPH: No lymphadenopathy noted  SKIN: No rashes or lesions    Consultant(s) Notes Reviewed:  [x ] YES  [ ] NO  Care Discussed with Consultants/Other Providers [ x] YES  [ ] NO    LABS:                        9.5    3.67  )-----------( 101      ( 18 Nov 2020 06:50 )             29.9     11-18    140  |  101  |  43<H>  ----------------------------<  121<H>  3.4<L>   |  29  |  1.74<H>    Ca    9.1      18 Nov 2020 06:50  Phos  3.1     11-18  Mg     2.1     11-18          CAPILLARY BLOOD GLUCOSE                  RADIOLOGY & ADDITIONAL TESTS:    Imaging Personally Reviewed:  [x ] YES  [ ] NO

## 2020-11-19 NOTE — PROGRESS NOTE ADULT - PROBLEM SELECTOR PLAN 1
-c/o of dizziness leading up to fall   -no cp/sob  -EKG with no acute ischemia, V paced   -EP for PPM interrogation- unremarkable.   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 multi level degen and foraminal stenosis. HCT negative, T/L spine negative for fx.   -trauma/neuro surg f/u

## 2020-11-24 ENCOUNTER — TRANSCRIPTION ENCOUNTER (OUTPATIENT)
Age: 85
End: 2020-11-24

## 2020-11-24 ENCOUNTER — APPOINTMENT (OUTPATIENT)
Dept: SPINE | Facility: CLINIC | Age: 85
End: 2020-11-24
Payer: MEDICARE

## 2020-11-24 VITALS
HEIGHT: 69 IN | WEIGHT: 165 LBS | DIASTOLIC BLOOD PRESSURE: 67 MMHG | TEMPERATURE: 97.6 F | BODY MASS INDEX: 24.44 KG/M2 | HEART RATE: 72 BPM | SYSTOLIC BLOOD PRESSURE: 113 MMHG

## 2020-11-24 DIAGNOSIS — S12.9XXA FRACTURE OF NECK, UNSPECIFIED, INITIAL ENCOUNTER: ICD-10-CM

## 2020-11-24 DIAGNOSIS — Z86.79 PERSONAL HISTORY OF OTHER DISEASES OF THE CIRCULATORY SYSTEM: ICD-10-CM

## 2020-11-24 DIAGNOSIS — Z86.69 PERSONAL HISTORY OF OTHER DISEASES OF THE NERVOUS SYSTEM AND SENSE ORGANS: ICD-10-CM

## 2020-11-24 DIAGNOSIS — Z78.9 OTHER SPECIFIED HEALTH STATUS: ICD-10-CM

## 2020-11-24 PROCEDURE — 99213 OFFICE O/P EST LOW 20 MIN: CPT

## 2020-11-24 RX ORDER — AMIODARONE HYDROCHLORIDE 200 MG/1
200 TABLET ORAL
Refills: 0 | Status: ACTIVE | COMMUNITY

## 2020-11-24 RX ORDER — METOLAZONE 5 MG/1
5 TABLET ORAL
Refills: 0 | Status: ACTIVE | COMMUNITY

## 2020-11-24 RX ORDER — TAMSULOSIN HYDROCHLORIDE 0.4 MG/1
0.4 CAPSULE ORAL
Refills: 0 | Status: ACTIVE | COMMUNITY

## 2020-11-24 RX ORDER — FUROSEMIDE 20 MG/1
20 TABLET ORAL
Refills: 0 | Status: ACTIVE | COMMUNITY

## 2020-11-24 RX ORDER — APIXABAN 2.5 MG/1
2.5 TABLET, FILM COATED ORAL
Refills: 0 | Status: ACTIVE | COMMUNITY

## 2020-11-24 RX ORDER — GABAPENTIN 100 MG
100 TABLET ORAL
Refills: 0 | Status: ACTIVE | COMMUNITY

## 2020-11-25 NOTE — ASSESSMENT
[FreeTextEntry1] : 89-year-old status post traumatic fall suffered cervical fracture\par \par Discontinue use of cervical collar\par No surgery or any other interventions recommended\par Follow up as needed

## 2020-11-25 NOTE — HISTORY OF PRESENT ILLNESS
[FreeTextEntry1] : Evaluation of cervical fracture [de-identified] : This is a 89 Year old man here for comprehensive evaluation of his Cervical Spine.\par HE is S/P Traumatic fall 11/13/20 and he suffered Cervical fracture\par He was hospitalized at [facility] X 7 days\par Today he is wearing a ASPEC Hard collar for stability\par Ambulates with walker. He denies any neck pain. He denies any weakness in his arms or legs.\par Today he presents with facial ecchymotic areas that is resolving\par no neurological deficit\par \par \par HPI\par Hospital Course: \par Discharge Date	19-Nov-2020 \par Admission Date	13-Nov-2020 16:46 \par Hospital Course	 \par 89M w/ PMHx of aFib on Xarelto, HAYLEE, presented to [facility] on 11/13 after suffering \par a unwitnessed mechanical fall. Patient was walking at home when he felt dizzy \par and lightheaded and fell forward hitting his face. Unknown LOC. Patient taken \par to the ED for further evaluation. Wife expressed that patient had been feeling \par dizzy and lightheaded for the past day. \par In the ED, patient was afebrile, hemodynamically stable, labs remarkable for \par significant electrolyte imbalances with a potassium of 2.6 and acute kidney \par injury with BUN of 78 and Creatinine of 2.24, CTH and neck revealed comminuted \par fractures of bilateral nasal bones and soft tissue swelling right sternomastoid \par muscle with low attenuation collection and fluid level, (6:102), prevertebral \par soft tissue swelling, fused C5-C7 vertebra, linear lucencies concerning for \par fractures of C5, C6, C7 vertebra, horizontal fracture through osseous fused \par anterior longitudinal ligament C5-C6, (605:28, 604:20), correlate for \par hyperextension injury. \par Given mechanism of injury and findings of multiple injuries, patient would \par benefit from pain control and consultation to plastic Sx for facial fractures \par and Orthopeadic surgery for cervical fractures. Nephrology was consulted as \par well for NURYS and recs were all followed. As per neurosurgery no acute \par neurosurgical intervention and custom c collar was delivered and placed. As per \par plastic surgery no immediate intervention was needed and he was advised to \par follow up in one week with . Internal medicine recs were followed \par as well. Cardiology was consulted, the EKG with no acute ischemia, V paced. -EP \par was called for PPM interrogation which was unremarkable. AC was held in setting \par of traumatic fall. 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 MRA \par of the neck for further evaluation and MRI of the brain to assess for possible \par infarctions was performed on 11/18. Neuro exam remained stable. \par Patient was seen by PT which recommended home with home pt. \par cardiology recommended resuming anticoagulation h/h stable ok by trauma \par On day of discharge, the patient was tolerating diet, ambulating well and pain \par controlled. \par

## 2020-11-25 NOTE — PHYSICAL EXAM
[General Appearance - Alert] : alert [General Appearance - In No Acute Distress] : in no acute distress [Oriented To Time, Place, And Person] : oriented to person, place, and time [Impaired Insight] : insight and judgment were intact [Cranial Nerves Optic (II)] : visual acuity intact bilaterally,  pupils equal round and reactive to light [Cranial Nerves Oculomotor (III)] : extraocular motion intact [Cranial Nerves Trigeminal (V)] : facial sensation intact symmetrically [Cranial Nerves Facial (VII)] : face symmetrical [Cranial Nerves Vestibulocochlear (VIII)] : hearing was intact bilaterally [Cranial Nerves Glossopharyngeal (IX)] : tongue and palate midline [Cranial Nerves Accessory (XI - Cranial And Spinal)] : head turning and shoulder shrug symmetric [Cranial Nerves Hypoglossal (XII)] : there was no tongue deviation with protrusion [No Visual Abnormalities] : no visible abnormalities [Sclera] : the sclera and conjunctiva were normal [Outer Ear] : the ears and nose were normal in appearance [] : no respiratory distress [FreeTextEntry8] : Ambulates with Walker [FreeTextEntry1] : Ambulates with walker [Skin Color & Pigmentation] : normal skin color and pigmentation

## 2021-05-05 NOTE — ED ADULT NURSE NOTE - MUSCULOSKELETAL ASSESSMENT
Have you been tested for COVID  Yes           Have you been told you were positive for COVID No  Have you had any known exposure to someone that is positive for COVID No  Do you have a cough                   No              Do you have shortness of breath No                 Do you have a sore throat            No                Are you having chills                    No                Are you having muscle aches. No                    Please come to the hospital wearing a mask and have your significant other wear a mask as well. Both of you should check your temperature before leaving to come here,  if it is 100 or higher please call [phone] for instruction. [facility] PRE-ADMISSION TESTING INSTRUCTIONS    The Preadmission Testing patient is instructed accordingly using the following criteria (check applicable):    ARRIVAL INSTRUCTIONS:  [x] Parking the day of Surgery is located in the Main Entrance lot. Upon entering the door, make an immediate right to the surgery reception desk    [x] Bring photo ID and insurance card    [] Bring in a copy of Living will or Durable Power of  papers.     [x] Please be sure to arrange for responsible adult to provide transportation to and from the hospital    [x] Please arrange for responsible adult to be with you for the 24 hour period post procedure due to having anesthesia      GENERAL INSTRUCTIONS:    [x] Nothing by mouth after midnight, including gum, candy, mints or water    [x] You may brush your teeth, but do not swallow any water    [x] Take medications as instructed with 1-2 oz of water    [x] Stop herbal supplements and vitamins 5 days prior to procedure    [x] Follow preop dosing of blood thinners per physician instructions    [] Take 1/2 dose of evening insulin, but no insulin after midnight    [x] No oral diabetic medications after midnight    [x] If diabetic and have low blood sugar or feel symptomatic, take 1-2oz - - -

## 2021-08-07 ENCOUNTER — TRANSCRIPTION ENCOUNTER (OUTPATIENT)
Age: 86
End: 2021-08-07

## 2021-08-10 ENCOUNTER — TRANSCRIPTION ENCOUNTER (OUTPATIENT)
Age: 86
End: 2021-08-10

## 2021-08-31 ENCOUNTER — TRANSCRIPTION ENCOUNTER (OUTPATIENT)
Age: 86
End: 2021-08-31

## 2022-03-18 ENCOUNTER — TRANSCRIPTION ENCOUNTER (OUTPATIENT)
Age: 87
End: 2022-03-18

## 2022-08-24 ENCOUNTER — APPOINTMENT (OUTPATIENT)
Dept: SPINE | Facility: CLINIC | Age: 87
End: 2022-08-24

## 2022-11-14 ENCOUNTER — OFFICE (OUTPATIENT)
Dept: URBAN - METROPOLITAN AREA CLINIC 35 | Facility: CLINIC | Age: 87
Setting detail: OPHTHALMOLOGY
End: 2022-11-14
Payer: MEDICARE

## 2022-11-14 DIAGNOSIS — H40.1131: ICD-10-CM

## 2022-11-14 DIAGNOSIS — H26.493: ICD-10-CM

## 2022-11-14 DIAGNOSIS — H01.001: ICD-10-CM

## 2022-11-14 DIAGNOSIS — H02.834: ICD-10-CM
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
## 2022-11-14 DIAGNOSIS — H02.825: ICD-10-CM

## 2022-11-14 DIAGNOSIS — Z96.1: ICD-10-CM

## 2022-11-14 DIAGNOSIS — H02.831: ICD-10-CM

## 2022-11-14 DIAGNOSIS — H35.54: ICD-10-CM

## 2022-11-14 DIAGNOSIS — H35.3132: ICD-10-CM

## 2022-11-14 DIAGNOSIS — H35.372: ICD-10-CM

## 2022-11-14 DIAGNOSIS — H01.004: ICD-10-CM

## 2022-11-14 PROCEDURE — 99213 OFFICE O/P EST LOW 20 MIN: CPT | Performed by: OPHTHALMOLOGY

## 2022-11-14 PROCEDURE — 55555 MISCELLANEOUS CHARGES: CPT | Performed by: OPHTHALMOLOGY

## 2022-11-14 PROCEDURE — 92083 EXTENDED VISUAL FIELD XM: CPT | Performed by: OPHTHALMOLOGY

## 2022-11-14 ASSESSMENT — REFRACTION_CURRENTRX
OS_OVR_VA: 20/
OD_OVR_VA: 20/
OD_CYLINDER: -0.75
OD_VPRISM_DIRECTION: SV
OS_SPHERE: -1.25
OS_AXIS: 076
OS_VPRISM_DIRECTION: SV
OS_CYLINDER: -1.00
OD_SPHERE: -2.25
OS_AXIS: 087
OS_SPHERE: -0.75
OS_CYLINDER: -1.00
OD_CYLINDER: -0.75
OD_AXIS: 100
OD_AXIS: 106
OD_OVR_VA: 20/
OS_OVR_VA: 20/
OD_SPHERE: -2.00
OS_VPRISM_DIRECTION: SV
OD_VPRISM_DIRECTION: SV

## 2022-11-14 ASSESSMENT — REFRACTION_AUTOREFRACTION
OS_SPHERE: -0.75
OS_AXIS: 088
OD_CYLINDER: -1.50
OS_CYLINDER: -2.50
OD_AXIS: 098
OD_SPHERE: -1.75

## 2022-11-14 ASSESSMENT — AXIALLENGTH_DERIVED
OS_AL: 24.0111
OD_AL: 24.2117
OS_AL: 24.0111
OS_AL: 23.8607
OD_AL: 24.1095
OD_AL: 24.1095
OS_AL: 23.9107
OD_AL: 24.2117
OS_AL: 23.9107
OD_AL: 24.2117
OD_AL: 24.2631

## 2022-11-14 ASSESSMENT — REFRACTION_MANIFEST
OS_CYLINDER: -1.00
OS_SPHERE: -1.25
OS_AXIS: 080
OD_SPHERE: -1.50
OS_SPHERE: -1.50
OD_SPHERE: -2.00
OD_SPHERE: -2.00
OD_VA1: 20/20-
OD_CYLINDER: -1.25
OS_VA1: 20/30
OS_VA1: 20/25
OD_AXIS: 105
OS_ADD: +2.75
OD_CYLINDER: -0.75
OD_SPHERE: -2.00
OS_CYLINDER: -2.25
OS_CYLINDER: -1.00
OS_AXIS: 087
OD_AXIS: 106
OD_VA1: 20/20
OS_CYLINDER: -1.00
OS_AXIS: 090
OD_CYLINDER: -0.75
OD_VA1: 20/25
OS_SPHERE: PLANO
OD_VA1: 20/20-2
OS_AXIS: 075
OS_SPHERE: -0.50
OS_VA1: 20/40-3
OD_CYLINDER: -0.75
OD_AXIS: 095
OD_VA1: 20/25+2
OS_SPHERE: -1.25
OD_ADD: +2.75
OD_SPHERE: -1.50
OD_ADD: +2.75
OS_CYLINDER: -2.25
OS_VA1: 20/25-3
OS_ADD: +2.75
OS_AXIS: 080
OD_AXIS: 105
OD_CYLINDER: -1.25
OS_VA1: 20/20
OD_AXIS: 105

## 2022-11-14 ASSESSMENT — PACHYMETRY
OS_CT_CORRECTION: -2
OS_CT_UM: 573
OD_CT_UM: 583
OD_CT_CORRECTION: -3

## 2022-11-14 ASSESSMENT — KERATOMETRY
OD_K1POWER_DIOPTERS: 44.00
OD_K2POWER_DIOPTERS: 44.75
OS_AXISANGLE_DEGREES: 169
OS_K2POWER_DIOPTERS: 45.25
METHOD_AUTO_MANUAL: AUTO
OS_K1POWER_DIOPTERS: 43.75
OD_AXISANGLE_DEGREES: 026

## 2022-11-14 ASSESSMENT — LID EXAM ASSESSMENTS
OD_BLEPHARITIS: RUL 1+
OS_BLEPHARITIS: LUL 1+

## 2022-11-14 ASSESSMENT — TONOMETRY
OD_IOP_MMHG: 17
OS_IOP_MMHG: 18

## 2022-11-14 ASSESSMENT — SPHEQUIV_DERIVED
OD_SPHEQUIV: -2.375
OD_SPHEQUIV: -2.375
OS_SPHEQUIV: -1.75
OS_SPHEQUIV: -2
OS_SPHEQUIV: -1.75
OD_SPHEQUIV: -2.125
OD_SPHEQUIV: -2.375
OS_SPHEQUIV: -1.625
OS_SPHEQUIV: -2
OD_SPHEQUIV: -2.125
OD_SPHEQUIV: -2.5

## 2022-11-14 ASSESSMENT — VISUAL ACUITY
OS_BCVA: 20/20-1
OD_BCVA: 20/50-2

## 2022-11-14 ASSESSMENT — LID POSITION - DERMATOCHALASIS
OD_DERMATOCHALASIS: RUL 2+
OS_DERMATOCHALASIS: LUL 2+

## 2022-12-07 ENCOUNTER — NON-APPOINTMENT (OUTPATIENT)
Age: 87
End: 2022-12-07

## 2023-03-15 ENCOUNTER — OFFICE (OUTPATIENT)
Dept: URBAN - METROPOLITAN AREA CLINIC 35 | Facility: CLINIC | Age: 88
Setting detail: OPHTHALMOLOGY
End: 2023-03-15
Payer: MEDICARE

## 2023-03-15 VITALS — HEIGHT: 60 IN

## 2023-03-15 DIAGNOSIS — H40.1131: ICD-10-CM

## 2023-03-15 DIAGNOSIS — H26.493: ICD-10-CM

## 2023-03-15 DIAGNOSIS — H02.834: ICD-10-CM

## 2023-03-15 DIAGNOSIS — H01.001: ICD-10-CM

## 2023-03-15 DIAGNOSIS — Z96.1: ICD-10-CM

## 2023-03-15 DIAGNOSIS — H02.831: ICD-10-CM

## 2023-03-15 DIAGNOSIS — H02.825: ICD-10-CM

## 2023-03-15 DIAGNOSIS — H01.004: ICD-10-CM

## 2023-03-15 PROCEDURE — 99213 OFFICE O/P EST LOW 20 MIN: CPT | Performed by: OPHTHALMOLOGY

## 2023-03-15 ASSESSMENT — AXIALLENGTH_DERIVED
OD_AL: 24.1095
OS_AL: 23.8607
OS_AL: 24.0111
OD_AL: 24.2117
OS_AL: 24.0111
OD_AL: 24.2117
OD_AL: 24.2631
OS_AL: 23.9107
OS_AL: 23.8607
OD_AL: 24.1095
OS_AL: 23.9107
OD_AL: 24.2117
OD_AL: 24.1095

## 2023-03-15 ASSESSMENT — REFRACTION_CURRENTRX
OD_AXIS: 095
OS_VPRISM_DIRECTION: SV
OS_VPRISM_DIRECTION: SV
OS_AXIS: 092
OS_CYLINDER: -2.00
OD_OVR_VA: 20/
OD_OVR_VA: 20/
OS_SPHERE: -1.25
OS_CYLINDER: -1.00
OD_VPRISM_DIRECTION: SV
OD_AXIS: 106
OD_CYLINDER: -0.75
OD_VPRISM_DIRECTION: SV
OD_SPHERE: -1.50
OS_OVR_VA: 20/
OS_SPHERE: -0.50
OS_AXIS: 087
OS_OVR_VA: 20/
OD_SPHERE: -2.00
OD_CYLINDER: -1.25

## 2023-03-15 ASSESSMENT — KERATOMETRY
OS_K2POWER_DIOPTERS: 45.25
OS_K1POWER_DIOPTERS: 43.75
METHOD_AUTO_MANUAL: AUTO
OD_K1POWER_DIOPTERS: 44.00
OS_AXISANGLE_DEGREES: 169
OD_AXISANGLE_DEGREES: 026
OD_K2POWER_DIOPTERS: 44.75

## 2023-03-15 ASSESSMENT — REFRACTION_MANIFEST
OS_VA1: 20/40-3
OD_VA1: 20/25
OD_VA1: 20/20-
OD_AXIS: 106
OS_AXIS: 090
OD_ADD: +2.75
OS_VA1: 20/40-3
OS_AXIS: 087
OS_SPHERE: -0.50
OS_VA1: 20/30
OS_AXIS: 080
OD_ADD: +2.75
OD_CYLINDER: -0.75
OS_AXIS: 090
OS_VA1: 20/25
OD_AXIS: 105
OD_SPHERE: -1.50
OS_CYLINDER: -1.00
OS_CYLINDER: -2.25
OD_CYLINDER: -0.75
OS_ADD: +3.00
OD_VA1: 20/20
OS_SPHERE: PLANO
OS_SPHERE: -1.25
OD_CYLINDER: -1.25
OS_AXIS: 080
OS_CYLINDER: -1.00
OS_CYLINDER: -2.25
OS_SPHERE: -1.50
OD_CYLINDER: -1.25
OD_CYLINDER: -1.25
OS_VA1: 20/20
OS_VA1: 20/25-3
OD_SPHERE: -2.00
OD_AXIS: 095
OD_VA1: 20/20-2
OD_AXIS: 095
OD_VA1: 20/25+2
OS_CYLINDER: -1.00
OD_SPHERE: -1.50
OD_ADD: +3.00
OS_ADD: +2.75
OD_SPHERE: -1.50
OS_CYLINDER: -2.25
OD_CYLINDER: -0.75
OS_AXIS: 075
OS_SPHERE: -1.25
OD_VA1: 20/20-2
OS_ADD: +2.75
OD_SPHERE: -2.00
OD_AXIS: 105
OD_SPHERE: -2.00
OD_AXIS: 105
OS_SPHERE: -0.50

## 2023-03-15 ASSESSMENT — PACHYMETRY
OS_CT_UM: 573
OD_CT_UM: 583
OD_CT_CORRECTION: -3
OS_CT_CORRECTION: -2

## 2023-03-15 ASSESSMENT — REFRACTION_AUTOREFRACTION
OS_AXIS: 088
OD_CYLINDER: -1.50
OD_SPHERE: -1.75
OS_CYLINDER: -2.50
OS_SPHERE: -0.75
OD_AXIS: 098

## 2023-03-15 ASSESSMENT — LID EXAM ASSESSMENTS
OS_BLEPHARITIS: LUL 1+
OD_BLEPHARITIS: RUL 1+

## 2023-03-15 ASSESSMENT — LID POSITION - DERMATOCHALASIS
OS_DERMATOCHALASIS: LUL 2+
OD_DERMATOCHALASIS: RUL 2+

## 2023-03-15 ASSESSMENT — SPHEQUIV_DERIVED
OD_SPHEQUIV: -2.375
OD_SPHEQUIV: -2.5
OS_SPHEQUIV: -1.625
OD_SPHEQUIV: -2.375
OS_SPHEQUIV: -2
OS_SPHEQUIV: -2
OD_SPHEQUIV: -2.125
OD_SPHEQUIV: -2.375
OS_SPHEQUIV: -1.75
OS_SPHEQUIV: -1.75
OD_SPHEQUIV: -2.125
OD_SPHEQUIV: -2.125
OS_SPHEQUIV: -1.625

## 2023-03-15 ASSESSMENT — VISUAL ACUITY
OS_BCVA: 20/30-1+1
OD_BCVA: 20/40-2

## 2023-03-15 ASSESSMENT — TONOMETRY
OS_IOP_MMHG: 19
OD_IOP_MMHG: 18

## 2023-03-23 ENCOUNTER — NON-APPOINTMENT (OUTPATIENT)
Age: 88
End: 2023-03-23

## 2023-03-26 ENCOUNTER — NON-APPOINTMENT (OUTPATIENT)
Age: 88
End: 2023-03-26

## 2023-07-18 NOTE — CONSULT NOTE ADULT - PROVIDER SPECIALTY LIST ADULT
Plastic Surgery
Cardiology
Internal Medicine
Neurosurgery
Nephrology

## 2023-07-19 ENCOUNTER — OFFICE (OUTPATIENT)
Dept: URBAN - METROPOLITAN AREA CLINIC 35 | Facility: CLINIC | Age: 88
Setting detail: OPHTHALMOLOGY
End: 2023-07-19
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
## 2023-07-19 PROCEDURE — NO SHOW FE NO SHOW FEE: Performed by: OPHTHALMOLOGY
